# Patient Record
Sex: FEMALE | Race: WHITE | NOT HISPANIC OR LATINO | Employment: FULL TIME | URBAN - METROPOLITAN AREA
[De-identification: names, ages, dates, MRNs, and addresses within clinical notes are randomized per-mention and may not be internally consistent; named-entity substitution may affect disease eponyms.]

---

## 2022-07-06 ENCOUNTER — OFFICE VISIT (OUTPATIENT)
Dept: FAMILY MEDICINE CLINIC | Facility: CLINIC | Age: 32
End: 2022-07-06
Payer: COMMERCIAL

## 2022-07-06 VITALS
HEIGHT: 70 IN | RESPIRATION RATE: 16 BRPM | WEIGHT: 181.2 LBS | SYSTOLIC BLOOD PRESSURE: 122 MMHG | DIASTOLIC BLOOD PRESSURE: 80 MMHG | BODY MASS INDEX: 25.94 KG/M2 | HEART RATE: 85 BPM

## 2022-07-06 DIAGNOSIS — Z12.4 PAP SMEAR FOR CERVICAL CANCER SCREENING: ICD-10-CM

## 2022-07-06 DIAGNOSIS — J35.8 TONSIL ASYMMETRY: ICD-10-CM

## 2022-07-06 DIAGNOSIS — R00.2 PALPITATIONS: Primary | ICD-10-CM

## 2022-07-06 PROCEDURE — 3725F SCREEN DEPRESSION PERFORMED: CPT | Performed by: PHYSICIAN ASSISTANT

## 2022-07-06 PROCEDURE — 99214 OFFICE O/P EST MOD 30 MIN: CPT | Performed by: PHYSICIAN ASSISTANT

## 2022-07-06 RX ORDER — MELATONIN
5000 DAILY
COMMUNITY

## 2022-07-06 RX ORDER — RIBOFLAVIN (VITAMIN B2) 100 MG
100 TABLET ORAL DAILY
COMMUNITY

## 2022-07-06 RX ORDER — DIPHENOXYLATE HYDROCHLORIDE AND ATROPINE SULFATE 2.5; .025 MG/1; MG/1
1 TABLET ORAL DAILY
COMMUNITY

## 2022-07-06 NOTE — PROGRESS NOTES
Assessment/Plan:    1  Palpitations    - reviewed CBC, CMP, will order echo and TSh due to patient family history of mitral valve replacement and symptoms  Can consider holter also  - TSH, 3rd generation with Free T4 reflex; Future  - Echo complete w/ contrast if indicated; Future    2  Tonsil asymmetry    - advised normal per dentist, area examined today, possible stone, difficult to visualize as it seems to be resolving  Advised if area gets larger, painful will refer to [facility]    F/u as needed    Subjective:   Chief Complaint   Patient presents with   Levy Establish Care    Physical Exam    cardiac problem    Spot on tonsil       Patient ID: Mikayla Whittington is a 27 y o  female  Patient mom had mitral valve replacement age 43, her sister also had same valve replacement  Had a clot and was found second  Has had palpitations off and on  Was getting them daily every day for a week, felt like it was stumbling over it self  Then resolved  Brief  Has had Lyme infection in the past  Has had it twice, gets fever, chills, shakes  Gets treated with doxy  They also put her on prednisone for the joint swelling  The following portions of the patient's history were reviewed and updated as appropriate: allergies, current medications, past family history, past medical history, past social history, past surgical history and problem list     History reviewed  No pertinent past medical history    Past Surgical History:   Procedure Laterality Date    WISDOM TOOTH EXTRACTION       Family History   Problem Relation Age of Onset    Heart Valve Disease Mother     Anxiety disorder Father     Anxiety disorder Brother     Depression Brother     Bipolar disorder Brother     Alcohol abuse Neg Hx     Substance Abuse Neg Hx      Social History     Socioeconomic History    Marital status: /Civil Union     Spouse name: Not on file    Number of children: Not on file    Years of education: Not on file    Highest education level: Not on file   Occupational History    Not on file   Tobacco Use    Smoking status: Never Smoker    Smokeless tobacco: Never Used   Vaping Use    Vaping Use: Never used   Substance and Sexual Activity    Alcohol use: Not Currently    Drug use: Not Currently    Sexual activity: Not on file   Other Topics Concern    Not on file   Social History Narrative    Not on file     Social Determinants of Health     Financial Resource Strain: Not on file   Food Insecurity: Not on file   Transportation Needs: Not on file   Physical Activity: Not on file   Stress: Not on file   Social Connections: Not on file   Intimate Partner Violence: Not on file   Housing Stability: Not on file       Current Outpatient Medications:     Ascorbic Acid (vitamin C) 100 MG tablet, Take 100 mg by mouth daily, Disp: , Rfl:     cholecalciferol (VITAMIN D3) 1,000 units tablet, Take 5,000 Units by mouth daily, Disp: , Rfl:     multivitamin (THERAGRAN) TABS, Take 1 tablet by mouth daily, Disp: , Rfl:     Review of Systems          Objective:    Vitals:    07/06/22 1414   BP: 122/80   Pulse: 85   Resp: 16   Weight: 82 2 kg (181 lb 3 2 oz)   Height: 5' 9 5" (1 765 m)        Physical Exam  Constitutional:       Appearance: Normal appearance  She is well-developed and normal weight  HENT:      Head: Normocephalic and atraumatic  Mouth/Throat:      Mouth: Mucous membranes are moist       Pharynx: No oropharyngeal exudate or posterior oropharyngeal erythema  Comments: Right upper tonsil small faint white spot 1 mm  Cardiovascular:      Rate and Rhythm: Normal rate and regular rhythm  Pulses: Normal pulses  Heart sounds: Normal heart sounds  Pulmonary:      Effort: Pulmonary effort is normal       Breath sounds: Normal breath sounds  Musculoskeletal:         General: Normal range of motion  Cervical back: Normal range of motion and neck supple  Lymphadenopathy:      Cervical: No cervical adenopathy  Skin:     General: Skin is warm  Neurological:      General: No focal deficit present  Mental Status: She is alert and oriented to person, place, and time  Psychiatric:         Mood and Affect: Mood normal          Behavior: Behavior normal          Thought Content: Thought content normal          Judgment: Judgment normal              BMI Counseling: Body mass index is 26 37 kg/m²  The BMI is above normal  Nutrition recommendations include reducing portion sizes

## 2022-07-07 ENCOUNTER — APPOINTMENT (OUTPATIENT)
Dept: LAB | Facility: CLINIC | Age: 32
End: 2022-07-07
Payer: COMMERCIAL

## 2022-07-07 DIAGNOSIS — R00.2 PALPITATIONS: ICD-10-CM

## 2022-07-07 LAB — TSH SERPL DL<=0.05 MIU/L-ACNC: 1.73 UIU/ML (ref 0.45–4.5)

## 2022-07-07 PROCEDURE — 36415 COLL VENOUS BLD VENIPUNCTURE: CPT

## 2022-07-07 PROCEDURE — 84443 ASSAY THYROID STIM HORMONE: CPT

## 2022-07-08 ENCOUNTER — HOSPITAL ENCOUNTER (OUTPATIENT)
Dept: NON INVASIVE DIAGNOSTICS | Facility: HOSPITAL | Age: 32
Discharge: HOME | End: 2022-07-08
Payer: COMMERCIAL

## 2022-07-08 VITALS
BODY MASS INDEX: 25.91 KG/M2 | HEIGHT: 70 IN | SYSTOLIC BLOOD PRESSURE: 145 MMHG | HEART RATE: 73 BPM | DIASTOLIC BLOOD PRESSURE: 73 MMHG | WEIGHT: 181 LBS

## 2022-07-08 DIAGNOSIS — R00.2 PALPITATIONS: ICD-10-CM

## 2022-07-08 LAB
AORTIC ROOT: 2.7 CM
AORTIC VALVE MEAN VELOCITY: 10.4 M/S
APICAL FOUR CHAMBER EJECTION FRACTION: 68 %
ASCENDING AORTA: 2.7 CM
AV LVOT MEAN GRADIENT: 3 MMHG
AV LVOT PEAK GRADIENT: 8 MMHG
AV MEAN GRADIENT: 5 MMHG
AV PEAK GRADIENT: 9 MMHG
AV VELOCITY RATIO: 0.9
DOP CALC AO PEAK VEL: 1.53 M/S
DOP CALC AO VTI: 29.68 CM
DOP CALC LVOT PEAK VEL VTI: 26.07 CM
DOP CALC LVOT PEAK VEL: 1.37 M/S
E WAVE DECELERATION TIME: 196 MS
FRACTIONAL SHORTENING: 31 (ref 28–44)
INTERVENTRICULAR SEPTUM IN DIASTOLE (PARASTERNAL SHORT AXIS VIEW): 0.8 CM
INTERVENTRICULAR SEPTUM: 0.8 CM (ref 0.6–1.1)
LAAS-AP2: 16.8 CM2
LAAS-AP4: 12.9 CM2
LEFT ATRIUM SIZE: 3.1 CM
LEFT INTERNAL DIMENSION IN SYSTOLE: 3.1 CM (ref 2.1–4)
LEFT VENTRICULAR INTERNAL DIMENSION IN DIASTOLE: 4.5 CM (ref 3.5–6)
LEFT VENTRICULAR POSTERIOR WALL IN END DIASTOLE: 0.8 CM
LEFT VENTRICULAR STROKE VOLUME: 55 ML
LVSV (TEICH): 55 ML
MV E'TISSUE VEL-LAT: 22 CM/S
MV E'TISSUE VEL-SEP: 15 CM/S
MV PEAK A VEL: 0.5 M/S
MV PEAK E VEL: 102 CM/S
MV STENOSIS PRESSURE HALF TIME: 57 MS
MV VALVE AREA P 1/2 METHOD: 3.86
RIGHT ATRIAL 2D VOLUME: 20 ML
RIGHT ATRIUM AREA SYSTOLE A4C: 10 CM2
RIGHT VENTRICLE ID DIMENSION: 2.6 CM
SL CV LEFT ATRIUM LENGTH A2C: 5.3 CM
SL CV LV EF: 67
SL CV PED ECHO LEFT VENTRICLE DIASTOLIC VOLUME (MOD BIPLANE) 2D: 93 ML
SL CV PED ECHO LEFT VENTRICLE SYSTOLIC VOLUME (MOD BIPLANE) 2D: 38 ML
TR MAX PG: 11 MMHG
TR PEAK VELOCITY: 1.6 M/S
TRICUSPID VALVE PEAK REGURGITATION VELOCITY: 1.64 M/S

## 2022-07-08 PROCEDURE — 93306 TTE W/DOPPLER COMPLETE: CPT | Performed by: INTERNAL MEDICINE

## 2022-07-08 PROCEDURE — 93306 TTE W/DOPPLER COMPLETE: CPT

## 2023-04-11 DIAGNOSIS — Z00.00 ROUTINE ADULT HEALTH MAINTENANCE: Primary | ICD-10-CM

## 2023-04-24 ENCOUNTER — OFFICE VISIT (OUTPATIENT)
Dept: FAMILY MEDICINE CLINIC | Facility: CLINIC | Age: 33
End: 2023-04-24

## 2023-04-24 VITALS
BODY MASS INDEX: 25.34 KG/M2 | SYSTOLIC BLOOD PRESSURE: 122 MMHG | HEART RATE: 74 BPM | DIASTOLIC BLOOD PRESSURE: 84 MMHG | RESPIRATION RATE: 16 BRPM | OXYGEN SATURATION: 98 % | WEIGHT: 177 LBS | HEIGHT: 70 IN

## 2023-04-24 DIAGNOSIS — Z00.00 ANNUAL PHYSICAL EXAM: Primary | ICD-10-CM

## 2023-04-24 NOTE — PROGRESS NOTES
ADULT ANNUAL PHYSICAL  [facility]    NAME: Noris Dacosta  AGE: 34 y o  SEX: female  : 1990     DATE: 2023     Assessment and Plan:     Healthy 34year old female    Immunizations and preventive care screenings were discussed with patient today  Appropriate education was printed on patient's after visit summary  Counseling:  Alcohol/drug use: discussed moderation in alcohol intake, the recommendations for healthy alcohol use, and avoidance of illicit drug use  Dental Health: discussed importance of regular tooth brushing, flossing, and dental visits  Injury prevention: discussed safety/seat belts, safety helmets, smoke detectors, carbon dioxide detectors, and smoking near bedding or upholstery  · Exercise: the importance of regular exercise/physical activity was discussed  Recommend exercise 3-5 times per week for at least 30 minutes  Return in 1 year (on 2024)  Chief Complaint:     Chief Complaint   Patient presents with   • Physical Exam      History of Present Illness:     Adult Annual Physical   Patient here for a comprehensive physical exam  The patient reports no problems  Diet and Physical Activity  · Diet/Nutrition: well balanced diet  · Exercise: moderate cardiovascular exercise, vigorous cardiovascular exercise, strength training exercises and 3-4 times a week on average  Depression Screening  PHQ-2/9 Depression Screening    Little interest or pleasure in doing things: 0 - not at all  Feeling down, depressed, or hopeless: 0 - not at all  PHQ-2 Score: 0  PHQ-2 Interpretation: Negative depression screen       General Health  · Sleep: sleeps well and gets 7-8 hours of sleep on average  · Hearing: normal - bilateral   · Vision: no vision problems  · Dental: regular dental visits and brushes teeth twice daily         /GYN Health  · Last menstrual period: regular  · PAP - pending     Review of Systems:     Review of Systems   Constitutional: Negative  HENT: Negative  Eyes: Negative  Respiratory: Negative  Cardiovascular: Negative  Gastrointestinal: Negative  Endocrine: Negative  Genitourinary: Negative  Musculoskeletal: Negative  Skin: Negative  Allergic/Immunologic: Negative  Neurological: Negative  Hematological: Negative  Psychiatric/Behavioral: Negative  Past Medical History:     History reviewed  No pertinent past medical history     Past Surgical History:     Past Surgical History:   Procedure Laterality Date   • WISDOM TOOTH EXTRACTION        Social History:     Social History     Socioeconomic History   • Marital status: /Civil Union     Spouse name: None   • Number of children: None   • Years of education: None   • Highest education level: None   Occupational History   • None   Tobacco Use   • Smoking status: Never   • Smokeless tobacco: Never   Vaping Use   • Vaping Use: Never used   Substance and Sexual Activity   • Alcohol use: Not Currently   • Drug use: Not Currently   • Sexual activity: None   Other Topics Concern   • None   Social History Narrative   • None     Social Determinants of Health     Financial Resource Strain: Not on file   Food Insecurity: Not on file   Transportation Needs: Not on file   Physical Activity: Not on file   Stress: Not on file   Social Connections: Not on file   Intimate Partner Violence: Not on file   Housing Stability: Not on file      Family History:     Family History   Problem Relation Age of Onset   • Heart Valve Disease Mother    • Anxiety disorder Father    • Anxiety disorder Brother    • Depression Brother    • Bipolar disorder Brother    • Heart Valve Disease Maternal Aunt    • Alcohol abuse Neg Hx    • Substance Abuse Neg Hx       Current Medications:     Current Outpatient Medications   Medication Sig Dispense Refill   • Ascorbic Acid (vitamin C) 100 MG tablet Take 100 mg by mouth "daily     • cholecalciferol (VITAMIN D3) 1,000 units tablet Take 5,000 Units by mouth daily     • multivitamin (THERAGRAN) TABS Take 1 tablet by mouth daily       No current facility-administered medications for this visit  Allergies:     No Known Allergies   Physical Exam:     /84 (BP Location: Left arm, Patient Position: Sitting, Cuff Size: Standard)   Pulse 74   Resp 16   Ht 5' 9 5\" (1 765 m)   Wt 80 3 kg (177 lb)   SpO2 98%   BMI 25 76 kg/m²     Physical Exam  Constitutional:       Appearance: Normal appearance  She is well-developed and normal weight  HENT:      Head: Normocephalic and atraumatic  Right Ear: Hearing, tympanic membrane, ear canal and external ear normal       Left Ear: Hearing, tympanic membrane, ear canal and external ear normal       Nose: Nose normal       Mouth/Throat:      Mouth: Mucous membranes are moist       Pharynx: Oropharynx is clear  Uvula midline  Eyes:      Extraocular Movements: Extraocular movements intact  Conjunctiva/sclera: Conjunctivae normal       Pupils: Pupils are equal, round, and reactive to light  Neck:      Thyroid: No thyromegaly  Cardiovascular:      Rate and Rhythm: Normal rate and regular rhythm  Heart sounds: Normal heart sounds  No murmur heard  Pulmonary:      Effort: Pulmonary effort is normal       Breath sounds: Normal breath sounds  Abdominal:      General: Bowel sounds are normal  There is no distension  Palpations: Abdomen is soft  There is no mass  Tenderness: There is no abdominal tenderness  Musculoskeletal:         General: Normal range of motion  Cervical back: Normal range of motion and neck supple  Lymphadenopathy:      Cervical: No cervical adenopathy  Skin:     General: Skin is warm  Neurological:      General: No focal deficit present  Mental Status: She is alert and oriented to person, place, and time  Cranial Nerves: No cranial nerve deficit        Deep Tendon Reflexes: " Reflexes normal    Psychiatric:         Mood and Affect: Mood normal          Behavior: Behavior normal          Thought Content:  Thought content normal          Judgment: Judgment normal           Fernando Gan PA-C   [address]

## 2023-07-07 ENCOUNTER — OFFICE VISIT (OUTPATIENT)
Dept: FAMILY MEDICINE CLINIC | Facility: CLINIC | Age: 33
End: 2023-07-07
Payer: COMMERCIAL

## 2023-07-07 VITALS
HEART RATE: 83 BPM | RESPIRATION RATE: 16 BRPM | TEMPERATURE: 98.2 F | OXYGEN SATURATION: 98 % | BODY MASS INDEX: 24.65 KG/M2 | DIASTOLIC BLOOD PRESSURE: 80 MMHG | SYSTOLIC BLOOD PRESSURE: 126 MMHG | HEIGHT: 70 IN | WEIGHT: 172.2 LBS

## 2023-07-07 DIAGNOSIS — R22.1 NECK MASS: Primary | ICD-10-CM

## 2023-07-07 PROCEDURE — 99214 OFFICE O/P EST MOD 30 MIN: CPT | Performed by: PHYSICIAN ASSISTANT

## 2023-07-07 RX ORDER — SODIUM FLUORIDE 5 MG/G
PASTE, DENTIFRICE DENTAL
COMMUNITY
Start: 2023-06-13

## 2023-07-07 NOTE — PROGRESS NOTES
Assessment/Plan:    1. Posterior neck mass - 1mm in size, freely movable, will US to confirm benign lesion    F/u as needed    Subjective:   Chief Complaint   Patient presents with   • Mass     On neck, by the hair   Noticed it a month ago  Now feels another one      Patient ID: Zoila Henson is a 34 y.o. female. Patient here with a lump in neck for 1 month the same size, now there is a second one. In the morning feels more like a lump then as day goes on kind of flattens like a pancake, then can be more prominent during day. The following portions of the patient's history were reviewed and updated as appropriate: allergies, current medications, past family history, past medical history, past social history, past surgical history and problem list.    History reviewed. No pertinent past medical history.   Past Surgical History:   Procedure Laterality Date   • WISDOM TOOTH EXTRACTION       Family History   Problem Relation Age of Onset   • Heart Valve Disease Mother    • Anxiety disorder Father    • Anxiety disorder Brother    • Depression Brother    • Bipolar disorder Brother    • Heart Valve Disease Maternal Aunt    • Alcohol abuse Neg Hx    • Substance Abuse Neg Hx      Social History     Socioeconomic History   • Marital status: /Civil Union     Spouse name: Not on file   • Number of children: Not on file   • Years of education: Not on file   • Highest education level: Not on file   Occupational History   • Not on file   Tobacco Use   • Smoking status: Never   • Smokeless tobacco: Never   Vaping Use   • Vaping Use: Never used   Substance and Sexual Activity   • Alcohol use: Not Currently   • Drug use: Not Currently   • Sexual activity: Not on file   Other Topics Concern   • Not on file   Social History Narrative   • Not on file     Social Determinants of Health     Financial Resource Strain: Not on file   Food Insecurity: Not on file   Transportation Needs: Not on file   Physical Activity: Not on file   Stress: Not on file   Social Connections: Not on file   Intimate Partner Violence: Not on file   Housing Stability: Not on file       Current Outpatient Medications:   •  Ascorbic Acid (vitamin C) 100 MG tablet, Take 100 mg by mouth daily, Disp: , Rfl:   •  cholecalciferol (VITAMIN D3) 1,000 units tablet, Take 5,000 Units by mouth daily, Disp: , Rfl:   •  multivitamin (THERAGRAN) TABS, Take 1 tablet by mouth daily, Disp: , Rfl:   •  Sodium Fluoride 5000 PPM 1.1 % CREA, Use as directed, Disp: , Rfl:     Review of Systems   Constitutional: Negative. HENT: Negative. Respiratory: Negative. Cardiovascular: Negative. Musculoskeletal: Negative. Neurological: Negative. Hematological: Negative. Objective:    Vitals:    07/07/23 1436   BP: 126/80   Pulse: 83   Resp: 16   Temp: 98.2 °F (36.8 °C)   SpO2: 98%   Weight: 78.1 kg (172 lb 3.2 oz)   Height: 5' 9.5" (1.765 m)        Physical Exam  Constitutional:       Appearance: Normal appearance. HENT:      Head: Normocephalic and atraumatic. Neck:      Comments: Right side of posterior neck, slightly lateral to cervical spine 1 mm soft, freely movable mass  Musculoskeletal:         General: Normal range of motion. Cervical back: Normal range of motion and neck supple. Skin:     General: Skin is warm. Neurological:      General: No focal deficit present. Mental Status: She is alert and oriented to person, place, and time. Psychiatric:         Mood and Affect: Mood normal.         Behavior: Behavior normal.         Thought Content:  Thought content normal.         Judgment: Judgment normal.

## 2023-07-10 ENCOUNTER — HOSPITAL ENCOUNTER (OUTPATIENT)
Dept: ULTRASOUND IMAGING | Facility: HOSPITAL | Age: 33
Discharge: HOME | End: 2023-07-10
Payer: COMMERCIAL

## 2023-07-10 DIAGNOSIS — R22.1 NECK MASS: ICD-10-CM

## 2023-07-10 PROCEDURE — 76536 US EXAM OF HEAD AND NECK: CPT

## 2023-09-18 ENCOUNTER — OFFICE VISIT (OUTPATIENT)
Age: 33
End: 2023-09-18
Payer: COMMERCIAL

## 2023-09-18 VITALS
DIASTOLIC BLOOD PRESSURE: 74 MMHG | BODY MASS INDEX: 24.08 KG/M2 | WEIGHT: 168.2 LBS | HEIGHT: 70 IN | SYSTOLIC BLOOD PRESSURE: 122 MMHG

## 2023-09-18 DIAGNOSIS — Z01.419 ENCOUNTER FOR ANNUAL ROUTINE GYNECOLOGICAL EXAMINATION: Primary | ICD-10-CM

## 2023-09-18 PROCEDURE — S0610 ANNUAL GYNECOLOGICAL EXAMINA: HCPCS | Performed by: OBSTETRICS & GYNECOLOGY

## 2023-09-18 NOTE — PROGRESS NOTES
Assessment/Plan:    Encounter for annual routine gynecological examination        David Zeng is a 34 y.o. female who presents for annual exam. Periods are regular. She denies any breast, urinary or sexual concerns. Planning to try for conception this December. Current contraception: condoms  History of abnormal Pap smear: no  Regular self breast exam: yes  History of abnormal mammogram: no  History of abnormal lipids: no    Menstrual History:  OB History        1    Para   1    Term   1       0    AB   0    Living   1       SAB   0    IAB   0    Ectopic   0    Multiple   0    Live Births   1                Patient's last menstrual period was 2023 (exact date). Period Cycle (Days):  (24-26)  Period Duration (Days): 3-7  Period Pattern: Regular  Menstrual Flow: Light, Moderate  Menstrual Control: Tampon  Menstrual Control Change Freq (Hours): 2-3  Dysmenorrhea: None    History reviewed. No pertinent past medical history. Family History   Problem Relation Age of Onset   • Heart Valve Disease Mother    • Anxiety disorder Father    • Anxiety disorder Brother    • Depression Brother    • Bipolar disorder Brother    • Heart Valve Disease Maternal Aunt    • Alcohol abuse Neg Hx    • Substance Abuse Neg Hx        The following portions of the patient's history were reviewed and updated as appropriate: allergies, current medications, past family history, past medical history, past social history, past surgical history and problem list.    Review of Systems  Pertinent items are noted in HPI.       Objective      /74 (BP Location: Right arm, Patient Position: Sitting, Cuff Size: Standard)   Ht 5' 9.5" (1.765 m)   Wt 76.3 kg (168 lb 3.2 oz)   LMP 2023 (Exact Date)   BMI 24.48 kg/m²     General:   alert and oriented, in no acute distress, appears stated age and cooperative   Heart:  Breasts: regular rate and rhythm   appear normal, no suspicious masses, no skin or nipple changes or axillary nodes.    Lungs: effort normal   Abdomen: soft, non-tender, without masses or organomegaly   Vulva: normal   Vagina: normal mucosa   Cervix: no lesions   Uterus: normal size, mobile, non-tender   Adnexa: normal adnexa and no mass, fullness, tenderness

## 2023-11-14 DIAGNOSIS — M79.673 PAIN OF FOOT, UNSPECIFIED LATERALITY: Primary | ICD-10-CM

## 2023-11-29 ENCOUNTER — APPOINTMENT (OUTPATIENT)
Dept: RADIOLOGY | Facility: CLINIC | Age: 33
End: 2023-11-29
Payer: COMMERCIAL

## 2023-11-29 ENCOUNTER — OFFICE VISIT (OUTPATIENT)
Dept: PODIATRY | Facility: CLINIC | Age: 33
End: 2023-11-29
Payer: COMMERCIAL

## 2023-11-29 VITALS
WEIGHT: 168 LBS | SYSTOLIC BLOOD PRESSURE: 144 MMHG | HEIGHT: 70 IN | BODY MASS INDEX: 24.05 KG/M2 | HEART RATE: 68 BPM | DIASTOLIC BLOOD PRESSURE: 77 MMHG

## 2023-11-29 DIAGNOSIS — M72.2 PLANTAR FASCIITIS: Primary | ICD-10-CM

## 2023-11-29 DIAGNOSIS — M77.32 CALCANEAL SPUR, LEFT: ICD-10-CM

## 2023-11-29 DIAGNOSIS — M79.673 PAIN OF FOOT, UNSPECIFIED LATERALITY: ICD-10-CM

## 2023-11-29 DIAGNOSIS — M79.672 LEFT FOOT PAIN: ICD-10-CM

## 2023-11-29 PROCEDURE — 73630 X-RAY EXAM OF FOOT: CPT

## 2023-11-29 PROCEDURE — 99203 OFFICE O/P NEW LOW 30 MIN: CPT | Performed by: PODIATRIST

## 2023-11-29 NOTE — LETTER
November 29, 2023     Tammyalize IsaacDarian, [address] [facility],[address], [address] [address]    Patient: Silvestre Bañuelos   YOB: 1990   Date of Visit: 11/29/2023       Dear Dr. Christopher Sibley: Thank you for referring Silvestre Bañuelos to me for evaluation. Below are my notes for this consultation. If you have questions, please do not hesitate to call me. I look forward to following your patient along with you. Sincerely,        Dennys Sandoval DPM        CC: No Recipients    ERIC Cash [facility]  11/29/2023  3:22 PM  Sign when Signing Visit                 PATIENT:  Silvestre Bañuelos  1990         ASSESSMENT:     1. Plantar fasciitis  Ambulatory referral to Physical Therapy      2. Calcaneal spur, left        3. Left foot pain  Ambulatory Referral to Podiatry    XR foot 3+ vw left    Ambulatory referral to Physical Therapy                PLAN:  1. Reviewed medical records. Reviewed the note from PCP. Patient was counseled and educated on the condition and the diagnosis. 2. X-ray was obtained and personally reviewed. The radiological findings were discussed with the patient. 3. The exam and symptoms are consistent with plantar fasciitis. The diagnosis, treatment options and prognosis were discussed with the patient. 4. Pt not amenable for injection or medicine. Referred her to PT. 5. Instructed supportive care, home exercise, icing, and proper footwear/ arch support. Discussed possible injection depending on the progress. 6. Patient will return in 6 weeks for re-evaluation. Imaging: I have personally reviewed pertinent films in PACS  Labs, pathology, and Other Studies: I have personally reviewed pertinent reports. Subjective:       HPI  The patient was referred to my office for evaluation of left heel pain. She had it for about 8 months now. Pain is significant around left heel. Pain level is about 6-7 out of 10. Pain increases with walking and standing. The patient does not recall any injury. No associated numbness or paresthesia. No significant weakness or dysfunction. The following portions of the patient's history were reviewed and updated as appropriate: allergies, current medications, past family history, past medical history, past social history, past surgical history and problem list.  All pertinent labs and images were reviewed. Past Medical History  History reviewed. No pertinent past medical history. Past Surgical History  Past Surgical History:   Procedure Laterality Date   • WISDOM TOOTH EXTRACTION          Allergies:  Patient has no known allergies. Medications:  Current Outpatient Medications   Medication Sig Dispense Refill   • Ascorbic Acid (vitamin C) 100 MG tablet Take 100 mg by mouth daily     • cholecalciferol (VITAMIN D3) 1,000 units tablet Take 5,000 Units by mouth daily     • multivitamin (THERAGRAN) TABS Take 1 tablet by mouth daily     • Sodium Fluoride 5000 PPM 1.1 % CREA Use as directed       No current facility-administered medications for this visit.        Social History:  Social History     Socioeconomic History   • Marital status: /Civil Union     Spouse name: None   • Number of children: None   • Years of education: None   • Highest education level: None   Occupational History   • None   Tobacco Use   • Smoking status: Never   • Smokeless tobacco: Never   Vaping Use   • Vaping Use: Never used   Substance and Sexual Activity   • Alcohol use: Not Currently   • Drug use: Not Currently   • Sexual activity: Yes     Partners: Male     Birth control/protection: Condom   Other Topics Concern   • None   Social History Narrative   • None     Social Determinants of Health     Financial Resource Strain: Not on file   Food Insecurity: Not on file   Transportation Needs: Not on file   Physical Activity: Not on file   Stress: Not on file   Social Connections: Not on file   Intimate Partner Violence: Not on file   Housing Stability: Not on file          Review of Systems   Constitutional:  Negative for chills and fever. Respiratory:  Negative for cough and shortness of breath. Cardiovascular:  Negative for chest pain. Gastrointestinal:  Negative for nausea and vomiting. Musculoskeletal:  Negative for gait problem. Skin:  Negative for wound. Allergic/Immunologic: Negative for immunocompromised state. Neurological:  Negative for weakness and numbness. Hematological: Negative. Psychiatric/Behavioral:  Negative for behavioral problems and confusion. Objective:      /77   Pulse 68   Ht 5' 9.5" (1.765 m)   Wt 76.2 kg (168 lb)   BMI 24.45 kg/m²          Physical Exam  Vitals reviewed. Constitutional:       General: She is not in acute distress. Appearance: She is not toxic-appearing or diaphoretic. HENT:      Head: Normocephalic and atraumatic. Eyes:      Extraocular Movements: Extraocular movements intact. Cardiovascular:      Rate and Rhythm: Normal rate and regular rhythm. Pulses: Normal pulses. Dorsalis pedis pulses are 2+ on the right side and 2+ on the left side. Posterior tibial pulses are 2+ on the right side and 2+ on the left side. Pulmonary:      Effort: Pulmonary effort is normal. No respiratory distress. Musculoskeletal:         General: Tenderness present. No swelling or signs of injury. Cervical back: Normal range of motion and neck supple. Right lower leg: No edema. Left lower leg: No edema. Right foot: No foot drop. Left foot: No foot drop. Comments: Focal pain in left plantar medial heel. No pain on lateral compression left heel. No Tinel sign. Skin:     General: Skin is warm. Capillary Refill: Capillary refill takes less than 2 seconds. Coloration: Skin is not cyanotic or mottled. Findings: No abscess. Nails: There is no clubbing. Neurological:      General: No focal deficit present. Mental Status: She is alert and oriented to person, place, and time. Cranial Nerves: No cranial nerve deficit. Sensory: No sensory deficit. Motor: No weakness. Coordination: Coordination normal.   Psychiatric:         Mood and Affect: Mood normal.         Behavior: Behavior normal.         Thought Content:  Thought content normal.         Judgment: Judgment normal.

## 2023-11-29 NOTE — PROGRESS NOTES
PATIENT:  Vijaya Sanchez  1990         ASSESSMENT:     1. Plantar fasciitis  Ambulatory referral to Physical Therapy      2. Calcaneal spur, left        3. Left foot pain  Ambulatory Referral to Podiatry    XR foot 3+ vw left    Ambulatory referral to Physical Therapy                PLAN:  1. Reviewed medical records. Reviewed the note from PCP. Patient was counseled and educated on the condition and the diagnosis. 2. X-ray was obtained and personally reviewed. The radiological findings were discussed with the patient. 3. The exam and symptoms are consistent with plantar fasciitis. The diagnosis, treatment options and prognosis were discussed with the patient. 4. Pt not amenable for injection or medicine. Referred her to PT. 5. Instructed supportive care, home exercise, icing, and proper footwear/ arch support. Discussed possible injection depending on the progress. 6. Patient will return in 6 weeks for re-evaluation. Imaging: I have personally reviewed pertinent films in PACS  Labs, pathology, and Other Studies: I have personally reviewed pertinent reports. Subjective:       HPI  The patient was referred to my office for evaluation of left heel pain. She had it for about 8 months now. Pain is significant around left heel. Pain level is about 6-7 out of 10. Pain increases with walking and standing. The patient does not recall any injury. No associated numbness or paresthesia. No significant weakness or dysfunction. The following portions of the patient's history were reviewed and updated as appropriate: allergies, current medications, past family history, past medical history, past social history, past surgical history and problem list.  All pertinent labs and images were reviewed. Past Medical History  History reviewed. No pertinent past medical history.     Past Surgical History  Past Surgical History:   [facility] Later[facility] Date    [facility]          Allergies:  Patient has no known allergies. Medications:  Current Outpatient Medications   Medication Sig Dispense Refill    Ascorbic Acid (vitamin C) 100 MG tablet Take 100 mg by mouth daily      cholecalciferol (VITAMIN D3) 1,000 units tablet Take 5,000 Units by mouth daily      multivitamin (THERAGRAN) TABS Take 1 tablet by mouth daily      Sodium Fluoride 5000 PPM 1.1 % CREA Use as directed       No current facility-administered medications for this visit. Social History:  Social History     Socioeconomic History    Marital status: /Civil Union     Spouse name: None    Number of children: None    Years of education: None    Highest education level: None   Occupational History    None   Tobacco Use    Smoking status: Never    Smokeless tobacco: Never   Vaping Use    Vaping Use: Never used   Substance and Sexual Activity    Alcohol use: Not Currently    Drug use: Not Currently    Sexual activity: Yes     Partners: Male     Birth control/protection: Condom   Other Topics Concern    None   Social History Narrative    None     Social Determinants of Health     Financial Resource Strain: Not on file   Food Insecurity: Not on file   Transportation Needs: Not on file   Physical Activity: Not on file   Stress: Not on file   Social Connections: Not on file   Intimate Partner Violence: Not on file   Housing Stability: Not on file          Review of Systems   Constitutional:  Negative for chills and fever. Respiratory:  Negative for cough and shortness of breath. Cardiovascular:  Negative for chest pain. Gastrointestinal:  Negative for nausea and vomiting. Musculoskeletal:  Negative for gait problem. Skin:  Negative for wound. Allergic/Immunologic: Negative for immunocompromised state. Neurological:  Negative for weakness and numbness. Hematological: Negative. Psychiatric/Behavioral:  Negative for behavioral problems and confusion.           Objective:      BP 144/77   Pulse 68   Ht 5' 9.5" (1.765 m)   Wt 76.2 kg (168 lb)   BMI 24.45 kg/m²          Physical Exam  Vitals reviewed. Constitutional:       General: She is not in acute distress. Appearance: She is not toxic-appearing or diaphoretic. HENT:      Head: Normocephalic and atraumatic. Eyes:      Extraocular Movements: Extraocular movements intact. Cardiovascular:      Rate and Rhythm: Normal rate and regular rhythm. Pulses: Normal pulses. Dorsalis pedis pulses are 2+ on the right side and 2+ on the left side. Posterior tibial pulses are 2+ on the right side and 2+ on the left side. Pulmonary:      Effort: Pulmonary effort is normal. No respiratory distress. Musculoskeletal:         General: Tenderness present. No swelling or signs of injury. Cervical back: Normal range of motion and neck supple. Right lower leg: No edema. Left lower leg: No edema. Right foot: No foot drop. Left foot: No foot drop. Comments: Focal pain in left plantar medial heel. No pain on lateral compression left heel. No Tinel sign. Skin:     General: Skin is warm. Capillary Refill: Capillary refill takes less than 2 seconds. Coloration: Skin is not cyanotic or mottled. Findings: No abscess. Nails: There is no clubbing. Neurological:      General: No focal deficit present. Mental Status: She is alert and oriented to person, place, and time. Cranial Nerves: No cranial nerve deficit. Sensory: No sensory deficit. Motor: No weakness. Coordination: Coordination normal.   Psychiatric:         Mood and Affect: Mood normal.         Behavior: Behavior normal.         Thought Content:  Thought content normal.         Judgment: Judgment normal.

## 2023-12-05 NOTE — PROGRESS NOTES
PT Evaluation     Today's date: 2023  Patient name: Jose Antonio Larios  : 1990  MRN: [id number]  Referring provider: Amber Diaz DPM  Dx:   Encounter Diagnosis     ICD-10-CM    1. Left foot pain  M79.672 Ambulatory referral to Physical Therapy      2. Plantar fasciitis  M72.2 Ambulatory referral to Physical Therapy                     Assessment  Assessment details: Pt is a 28 y.o. female presenting to PT with chief complaint of chronic L heel/foot pain. PT findings include: limited DF ROM, great toe ext ROM deficits, positive windlass test, and recreation of pain to palpation of medial calcaneal tubercle. Findings are consistent with diagnosis. Pt educated on PT findings, anatomy, biomechanics, POC and rehab course. Pt will benefit from skilled PT to address above impairments to return to PLOF with less restriction and to reach functional goals. Impairments: abnormal gait, abnormal or restricted ROM, impaired physical strength and pain with function    Symptom irritability: low  Goals  1. Pt will demonstrate understanding of HEP and POC in order to improve compliance with course of rehab in 2 weeks. 2. Pt will maintain wearing appropriate shoes in order to decrease irritation to L heel for 4 weeks. 3. Pt's DF ROM will improve to WNL to decrease excessive plantar fascia loads with gait by d/c.   4.  Pt's pain will be 2/10 or less to allow pt to return to PLOF with least restriction by d/c.        Plan  Patient would benefit from: skilled physical therapy  Planned modality interventions: low level laser therapy  Planned therapy interventions: joint mobilization, manual therapy, patient education, strengthening, stretching, therapeutic activities, therapeutic exercise, home exercise program, flexibility and functional ROM exercises  Frequency: 2x week  Duration in weeks: 6  Treatment plan discussed with: patient    Subjective Evaluation    History of Present Illness  Mechanism of injury: Pt arrives to PT via referral from podiatrist for chronic L heel pain. Pt was diagnosed with plantar fasciitis. This has been going on for about 8 months. She denies any specific mechanism of injury. She experiences pain upon standing after being non weightbearing for period of time, as day progresses pain increases as well. Pt reports that she has been doing some stretches. She also reports massaging the foot. She has trialed foam rolling the calf and rolling the bottom of foot with lacrosse ball as well. No injection or medication trialed. Quality of life: good    Patient Goals  Patient goals for therapy: decreased pain, increased motion, increased strength and independence with ADLs/IADLs    Pain  Current pain ratin  At worst pain ratin  Location: L foot  Quality: sharp, tight and discomfort        Objective    Lunge WB DF ROM:  R 7cm; L 8cm    Great toe Ext:  90°/90°    Ankle ROM:  DF: 15°/10°  PF: WNL B    Ankle strength:  DF: 5/5; 5/5  PF: 5/5; 5/5  EV: 5/5; 5/5  INV: 5/5; 5/5    Ankle Mobility:  TCJ AP: hypo B  Calcaneal med tilt: normal B  Calcaneal Lat tilt/glide: hypo B (>L)    Palpation: tenderness to palpation of medial calcaneal tubercle with pain recreation, no tenderness to mid substance plantar fascia    Windlass: positive. Precautions: None      Manuals             EPAT DI 2.0 barr 15Hz             IASTM NV            Laser NV            Fib AP leukotape Consider NV.              Calcaneal lateral glide/tilt mob             TCJ AP mob Consider WB mob            Neuro Re-Ed                                                                                                        Ther Ex             Calf stretch             Soleus stretch             Heel raise             HS stretch                                                                 Ther Activity                                       Gait Training                                       Modalities

## 2023-12-06 ENCOUNTER — EVALUATION (OUTPATIENT)
Dept: PHYSICAL THERAPY | Facility: CLINIC | Age: 33
End: 2023-12-06
Payer: COMMERCIAL

## 2023-12-06 DIAGNOSIS — M79.672 LEFT FOOT PAIN: ICD-10-CM

## 2023-12-06 DIAGNOSIS — M72.2 PLANTAR FASCIITIS: ICD-10-CM

## 2023-12-06 PROCEDURE — 97161 PT EVAL LOW COMPLEX 20 MIN: CPT | Performed by: PHYSICAL THERAPIST

## 2023-12-06 PROCEDURE — 97140 MANUAL THERAPY 1/> REGIONS: CPT | Performed by: PHYSICAL THERAPIST

## 2023-12-08 ENCOUNTER — OFFICE VISIT (OUTPATIENT)
Dept: PHYSICAL THERAPY | Facility: CLINIC | Age: 33
End: 2023-12-08
Payer: COMMERCIAL

## 2023-12-08 DIAGNOSIS — M79.672 LEFT FOOT PAIN: Primary | ICD-10-CM

## 2023-12-08 PROCEDURE — 97110 THERAPEUTIC EXERCISES: CPT | Performed by: PHYSICAL THERAPIST

## 2023-12-08 PROCEDURE — 97140 MANUAL THERAPY 1/> REGIONS: CPT | Performed by: PHYSICAL THERAPIST

## 2023-12-08 PROCEDURE — 97530 THERAPEUTIC ACTIVITIES: CPT | Performed by: PHYSICAL THERAPIST

## 2023-12-08 NOTE — PROGRESS NOTES
Daily Note     Today's date: 2023  Patient name: Pretty Johnson  : 1990  MRN: [id number]  Referring provider: Wisam Carvajal DPM  Dx:   Encounter Diagnosis     ICD-10-CM    1. Left foot pain  M79.672                      Subjective:pt reports no sig changes since her IE. Objective: See treatment diary below      Assessment: initiated tx as listed with no complaints. She has good tolerance to IASTM. Monitor response and progress as john NV. Plan: Continue per plan of care. Precautions: None      Manuals            EPAT DI 2.0 barr 15Hz             IASTM NV Kl PF and calf           Laser NV 4'           Fib AP leukotape Consider NV.              Calcaneal lateral glide/tilt mob  kl           TCJ AP mob Consider WB mob Kl supine           Neuro Re-Ed                                                                                                        Ther Ex             Calf stretch  Pro stretch 30"x4           Soleus stretch             Heel raise             HS stretch             Wobble board  30ea                                                    Ther Activity                                         Gait Training                                       Modalities

## 2023-12-12 ENCOUNTER — OFFICE VISIT (OUTPATIENT)
Dept: PHYSICAL THERAPY | Facility: CLINIC | Age: 33
End: 2023-12-12
Payer: COMMERCIAL

## 2023-12-12 DIAGNOSIS — M79.672 LEFT FOOT PAIN: Primary | ICD-10-CM

## 2023-12-12 DIAGNOSIS — M72.2 PLANTAR FASCIITIS: ICD-10-CM

## 2023-12-12 PROCEDURE — 97140 MANUAL THERAPY 1/> REGIONS: CPT | Performed by: PHYSICAL THERAPIST

## 2023-12-12 PROCEDURE — 97110 THERAPEUTIC EXERCISES: CPT | Performed by: PHYSICAL THERAPIST

## 2023-12-12 NOTE — PROGRESS NOTES
Daily Note     Today's date: 2023  Patient name: Nita Michaels  : 1990  MRN: [id number]  Referring provider: Blank Fairchild DPM  Dx:   Encounter Diagnosis     ICD-10-CM    1. Left foot pain  M79.672       2. Plantar fasciitis  M72.2                      Subjective: Pt reports temporary relief with IASTM last visit. She states that she performed calf stretches with high intensity and felt increased soreness at home. Objective: See treatment diary below      Assessment: Performed EPAT today and performed mobilizations as listed. Introduced loading of plantar fascia with heel raises; provided education on progressive loading. Taped patient with AP fibular leukotape technique, will assess pt's response. Self mobilizations reviewed. Pt will benefit from continued skilled PT. Plan: Continue per plan of care. Precautions: None      Manuals           EPAT DI 2.0 barr 15Hz   DL 2.4 barr 15 Hz          IASTM NV Kl PF and calf           Laser NV 4'           Fib AP leukotape Consider NV.    DL          Calcaneal lateral glide/tilt mob  kl DL          TCJ AP mob Consider WB mob Kl supine DL + MWM lungharini B          Neuro Re-Ed                                                                                                        Ther Ex             Calf stretch  Pro stretch 30"x4           Soleus stretch             Heel raise   3x10          HS stretch   manual          Wobble board  30ea             Self ankle mobilization   HEP                                    Ther Activity                                         Gait Training                                       Modalities

## 2023-12-15 ENCOUNTER — OFFICE VISIT (OUTPATIENT)
Dept: PHYSICAL THERAPY | Facility: CLINIC | Age: 33
End: 2023-12-15
Payer: COMMERCIAL

## 2023-12-15 DIAGNOSIS — M79.672 LEFT FOOT PAIN: Primary | ICD-10-CM

## 2023-12-15 DIAGNOSIS — M72.2 PLANTAR FASCIITIS: ICD-10-CM

## 2023-12-15 PROCEDURE — 97140 MANUAL THERAPY 1/> REGIONS: CPT

## 2023-12-15 PROCEDURE — 97110 THERAPEUTIC EXERCISES: CPT

## 2023-12-15 NOTE — PROGRESS NOTES
Daily Note     Today's date: 12/15/2023  Patient name: Lb Martinez  : 1990  MRN: [id number]  Referring provider: Dominick Manrique DPM  Dx:   Encounter Diagnosis     ICD-10-CM    1. Left foot pain  M79.672       2. Plantar fasciitis  M72.2           Start Time:   Stop Time: 151  Total time in clinic (min): 40 minutes      Subjective: Patient complains of continued left heel pain. Objective: See treatment diary below. Assessment: Treatment is tolerated well. Left heel is tender to palpation. Plan: Continue treatment as per PT plan of care. Precautions: None      Manuals 12/6 12/8 12/12 12/15         EPAT DI 2.0 barr 15Hz   DL 2.4 barr 15 Hz          IASTM NV Kl PF and calf  and DTM  JLW         Laser NV 4'  5'  16W  JLW         L ankle PROM    JLW         Fib AP leukotape Consider NV.    DL          Calcaneal lateral glide/tilt mob  kl DL          TCJ AP mob Consider WB mob Kl supine DL + MWM lungharini ABBOTT                       Neuro Re-Ed                                                                                           Ther Ex             Calf stretch  Pro stretch 30"x4  pro stretch  30"x4         Soleus stretch             Heel raise   3x10 3x10         HS stretch   manual          Wobble board  30ea  a/p, m/l  30 ea           Self ankle mobilization   HEP          tandem walking on foam    6 laps                      Ther Activity                                         Gait Training                                       Modalities

## 2023-12-18 ENCOUNTER — OFFICE VISIT (OUTPATIENT)
Dept: PHYSICAL THERAPY | Facility: CLINIC | Age: 33
End: 2023-12-18
Payer: COMMERCIAL

## 2023-12-18 DIAGNOSIS — M79.672 LEFT FOOT PAIN: Primary | ICD-10-CM

## 2023-12-18 DIAGNOSIS — M72.2 PLANTAR FASCIITIS: ICD-10-CM

## 2023-12-18 PROCEDURE — 97140 MANUAL THERAPY 1/> REGIONS: CPT

## 2023-12-18 PROCEDURE — 97110 THERAPEUTIC EXERCISES: CPT

## 2023-12-18 NOTE — PROGRESS NOTES
"Daily Note     Today's date: 2023  Patient name: Angelica Ratliff  : 1990  MRN: [id number]  Referring provider: Maximino Douglas DPM  Dx:   Encounter Diagnosis     ICD-10-CM    1. Left foot pain  M79.672       2. Plantar fasciitis  M72.2                      Subjective: Pt reports her L heel was quite painful after LV. Notes her pain level is 3/10 upon arrival to therapy, adding it is never lower then 3/10.    Objective: See treatment diary below    Assessment: Performed exercise program w/o complaint. Able to john EPAT, as below. Responded well to mobilizations. Tolerated treatment well. Will monitor. MD, PT  recommended pt get a shoe with an arch fill.    Plan: Continue per plan of care.      Precautions: None      Manuals 12/6 12/8 12/12 12/15 12/18        EPAT DI 2.0 barr 15Hz   DL 2.4 barr 15 Hz  DI  3.5 barr  15Hz        IASTM NV Kl PF and calf  and DTM  JLW NV        Laser NV 4'  5'  16W  JLW NV        L ankle PROM    JLW MO        Fib AP leukotape Consider NV.   DL          Calcaneal lateral glide/tilt mob  kl DL  MD        TCJ AP mob Consider WB mob Kl supine DL + MWM lizette ABBOTT MD                     Neuro Re-Ed                                                                                           Ther Ex             Calf stretch  Pro stretch 30\"x4  pro stretch  30\"x4 Pro stretch  30\"x4        Soleus stretch             Heel raise   3x10 3x10 3x10        HS stretch   manual          Wobble board  30ea  a/p, m/l  30 ea A/p, m/I 30 ea          Self ankle mobilization   HEP          tandem walking on foam    6 laps 6 laps                     Ther Activity                                         Gait Training                                       Modalities                                                "

## 2023-12-19 ENCOUNTER — APPOINTMENT (OUTPATIENT)
Dept: PHYSICAL THERAPY | Facility: CLINIC | Age: 33
End: 2023-12-19
Payer: COMMERCIAL

## 2023-12-20 ENCOUNTER — OFFICE VISIT (OUTPATIENT)
Dept: PHYSICAL THERAPY | Facility: CLINIC | Age: 33
End: 2023-12-20
Payer: COMMERCIAL

## 2023-12-20 DIAGNOSIS — M79.672 LEFT FOOT PAIN: Primary | ICD-10-CM

## 2023-12-20 DIAGNOSIS — M72.2 PLANTAR FASCIITIS: ICD-10-CM

## 2023-12-20 PROCEDURE — 97110 THERAPEUTIC EXERCISES: CPT | Performed by: PHYSICAL THERAPIST

## 2023-12-20 PROCEDURE — 97140 MANUAL THERAPY 1/> REGIONS: CPT | Performed by: PHYSICAL THERAPIST

## 2023-12-20 NOTE — PROGRESS NOTES
"Daily Note     Today's date: 2023  Patient name: Angelica Ratliff  : 1990  MRN: [id number]  Referring provider: Maximino Douglas DPM  Dx:   Encounter Diagnosis     ICD-10-CM    1. Left foot pain  M79.672       2. Plantar fasciitis  M72.2                      Subjective: Pt reports pain is relatively unchanged. Today feels like the heel is more irritated. She is planning on getting shoes with more arch support.       Objective: See treatment diary below      Assessment: Continued with treatment today, pt does present with pes cavus. Trialed leukotape to support arch. Will monitor pt's response. Pt will benefit from continued skilled PT.       Plan: Continue per plan of care.      Precautions: None      Manuals 12/6 12/8 12/12 12/15 12/18 12/20       EPAT DI 2.0 barr 15Hz   DL 2.4 barr 15 Hz  DI  3.5 barr  15Hz        IASTM NV Kl PF and calf  and DTM  JLW NV DL       Laser NV 4'  5'  16W  JLW NV        L ankle PROM    JLW MO DL       Fib AP leukotape Consider NV.   DL   Navicular sling       Calcaneal lateral glide/tilt mob  kl DL  MD DL       TCJ AP mob Consider WB mob Kl supine DL + MWM lizette NICHOLSON                     Neuro Re-Ed                                                                                           Ther Ex             Calf stretch  Pro stretch 30\"x4  pro stretch  30\"x4 Pro stretch  30\"x4        Soleus stretch             Heel raise   3x10 3x10 3x10 3x15       HS stretch   manual          Wobble board  30ea  a/p, m/l  30 ea A/p, m/I 30 ea   A/P, M/L 30 ea       Self ankle mobilization   HEP          tandem walking on foam    6 laps 6 laps        SLS      30\"x3       Ther Activity                                         Gait Training                                       Modalities                                                  "

## 2023-12-26 ENCOUNTER — OFFICE VISIT (OUTPATIENT)
Dept: PHYSICAL THERAPY | Facility: CLINIC | Age: 33
End: 2023-12-26
Payer: COMMERCIAL

## 2023-12-26 DIAGNOSIS — M79.672 LEFT FOOT PAIN: Primary | ICD-10-CM

## 2023-12-26 DIAGNOSIS — M72.2 PLANTAR FASCIITIS: ICD-10-CM

## 2023-12-26 PROCEDURE — 97110 THERAPEUTIC EXERCISES: CPT | Performed by: PHYSICAL THERAPIST

## 2023-12-26 PROCEDURE — 97140 MANUAL THERAPY 1/> REGIONS: CPT | Performed by: PHYSICAL THERAPIST

## 2023-12-26 NOTE — PROGRESS NOTES
"Daily Note     Today's date: 2023  Patient name: Angelica Ratliff  : 1990  MRN: [id number]  Referring provider: Maximino Douglas DPM  Dx:   Encounter Diagnosis     ICD-10-CM    1. Left foot pain  M79.672       2. Plantar fasciitis  M72.2                      Subjective: Pt reports that she did notice an improvement with taping. She purchased shoes with better arch support. By the end of the day, she continues with pain however.       Objective: See treatment diary below      Assessment: Pt tolerates treatment well. The fact that she improved with navicular sling, likely will improve with arch support. Pt will benefit from continued skilled PT.       Plan: Continue per plan of care.      Precautions: None      Manuals 12/6 12/8 12/12 12/15 12/18 12/20 12/26      EPAT DI 2.0 barr 15Hz   DL 2.4 barr 15 Hz  DI  3.5 barr  15Hz  DI  3.5 barr  15Hz      IASTM NV Kl PF and calf  and DTM  JLW NV DL       Laser NV 4'  5'  16W  JLW NV        L ankle PROM    JLW MO DL       Fib AP leukotape Consider NV.   DL   Navicular sling DL navicular sling      Calcaneal lateral glide/tilt mob  kl DL  MD DL DL      TCJ AP mob Consider WB mob Kl supine DL + MWM lunge B  MD DL  DL                   Neuro Re-Ed                                                                                           Ther Ex             Calf stretch  Pro stretch 30\"x4  pro stretch  30\"x4 Pro stretch  30\"x4        Soleus stretch             Heel raise   3x10 3x10 3x10 3x15 3x15      HS stretch   manual          Wobble board  30ea  a/p, m/l  30 ea A/p, m/I 30 ea   A/P, M/L 30 ea A/P, M/L 30 ea      Self ankle mobilization   HEP          tandem walking on foam    6 laps 6 laps        SLS      30\"x3       Ther Activity                                         Gait Training                                       Modalities                                                    "

## 2023-12-28 ENCOUNTER — OFFICE VISIT (OUTPATIENT)
Dept: PHYSICAL THERAPY | Facility: CLINIC | Age: 33
End: 2023-12-28
Payer: COMMERCIAL

## 2023-12-28 DIAGNOSIS — M72.2 PLANTAR FASCIITIS: ICD-10-CM

## 2023-12-28 DIAGNOSIS — M79.672 LEFT FOOT PAIN: Primary | ICD-10-CM

## 2023-12-28 PROCEDURE — 97140 MANUAL THERAPY 1/> REGIONS: CPT

## 2023-12-28 PROCEDURE — 97110 THERAPEUTIC EXERCISES: CPT

## 2023-12-28 NOTE — PROGRESS NOTES
"Daily Note     Today's date: 2023  Patient name: Angelica Ratliff  : 1990  MRN: [id number]  Referring provider: Maximino Douglas DPM  Dx:   Encounter Diagnosis     ICD-10-CM    1. Left foot pain  M79.672       2. Plantar fasciitis  M72.2                      Subjective: Pt reports no change in L heel pain.    Objective: See treatment diary below    Assessment: Performed exercise program w/o issue. Restrictions noted L arch and heel during IASTM. Applied tape as below. Will monitor.    Plan: Continue per plan of care.      Precautions: None      Manuals 12/6 12/8 12/12 12/15 12/18 12/20 12/26 12/28     EPAT DI 2.0 barr 15Hz   DL 2.4 barr 15 Hz  DI  3.5 barr  15Hz  DI  3.5 barr  15Hz      IASTM NV Kl PF and calf  and DTM  JLW NV DL  MO     Laser NV 4'  5'  16W  JLW NV        L ankle PROM    JLW MO DL       Fib AP leukotape Consider NV.   DL   Navicular sling DL navicular sling FH navicular sling     Calcaneal lateral glide/tilt mob  kl DL  MD DL DL FH     TCJ AP mob Consider WB mob Kl supine DL + MWM lunge B  MD DL  DL FH                  Neuro Re-Ed                                                                                           Ther Ex             Calf stretch  Pro stretch 30\"x4  pro stretch  30\"x4 Pro stretch  30\"x4        Soleus stretch             Heel raise   3x10 3x10 3x10 3x15 3x15 3x15     HS stretch   manual          Wobble board  30ea  a/p, m/l  30 ea A/p, m/I 30 ea   A/P, M/L 30 ea A/P, M/L 30 ea A/P,  M/L  30 ea     Self ankle mobilization   HEP          tandem walking on foam    6 laps 6 laps        SLS      30\"x3       Ther Activity                                         Gait Training                                       Modalities                                                      "

## 2024-01-02 ENCOUNTER — OFFICE VISIT (OUTPATIENT)
Dept: PHYSICAL THERAPY | Facility: CLINIC | Age: 34
End: 2024-01-02
Payer: COMMERCIAL

## 2024-01-02 DIAGNOSIS — M72.2 PLANTAR FASCIITIS: ICD-10-CM

## 2024-01-02 DIAGNOSIS — M79.672 LEFT FOOT PAIN: Primary | ICD-10-CM

## 2024-01-02 PROCEDURE — 97110 THERAPEUTIC EXERCISES: CPT | Performed by: PHYSICAL THERAPIST

## 2024-01-02 PROCEDURE — 97140 MANUAL THERAPY 1/> REGIONS: CPT | Performed by: PHYSICAL THERAPIST

## 2024-01-02 NOTE — PROGRESS NOTES
"Daily Note     Today's date: 2024  Patient name: Angelica Ratliff  : 1990  MRN: [id number]  Referring provider: Maximino Douglas DPM  Dx:   Encounter Diagnosis     ICD-10-CM    1. Left foot pain  M79.672       2. Plantar fasciitis  M72.2                      Subjective: Pt reports that she feels like things are going in the right direction with shoe wear change. She also reports that she has gotten oofos shoes for wearing inside her home.       Objective: See treatment diary below      Assessment: Pt tolerates treatment well today without significant complaints. Introduced heel raises with also u/l heel raise to load plantar fascia and intrinsics. Concluded with EPAT and navicular sling taping to support arch. Trialed low back exercises to determine if there is lumbar component to pain. Pt will benefit from continued skilled PT.       Plan: Continue per plan of care.      Precautions: None      Manuals 12/6 12/8 12/12 12/15 12/18 12/20 12/26 12/28 1/2    EPAT DI 2.0 barr 15Hz   DL 2.4 barr 15 Hz  DI  3.5 barr  15Hz  DI  3.5 barr  15Hz  DI  3.5 barr  15Hz    IASTM NV Kl PF and calf  and DTM  JLW NV DL  MO     Laser NV 4'  5'  16W  JLW NV        L ankle PROM    JLW MO DL       Fib AP leukotape Consider NV.   DL   Navicular sling DL navicular sling FH navicular sling DL navicular sling    Calcaneal lateral glide/tilt mob  kl DL  MD DL DL FH DL    TCJ AP mob Consider WB mob Kl supine DL + MWM lunge B  MD DL  DL FH DL    Lumbar PA         DL Gr III-IV    Neuro Re-Ed                                                                                           Ther Ex             Calf stretch  Pro stretch 30\"x4  pro stretch  30\"x4 Pro stretch  30\"x4        Soleus stretch             Heel raise   3x10 3x10 3x10 3x15 3x15 3x15 B 3x10 airex    U/l heel raise         U/l 3x10 ea. Without UE support    Sciatic nerve glide         Seated 2x10    Supine SLR         2x10     S/l hip abduction         2x10     Prone hip extension   " "      2x10                  HS stretch   manual          Wobble board  30ea  a/p, m/l  30 ea A/p, m/I 30 ea   A/P, M/L 30 ea A/P, M/L 30 ea A/P,  M/L  30 ea     Self ankle mobilization   HEP          tandem walking on foam    6 laps 6 laps        SLS      30\"x3       Ther Activity                                         Gait Training                                       Modalities                                                        "

## 2024-01-04 ENCOUNTER — OFFICE VISIT (OUTPATIENT)
Dept: PHYSICAL THERAPY | Facility: CLINIC | Age: 34
End: 2024-01-04
Payer: COMMERCIAL

## 2024-01-04 DIAGNOSIS — M79.672 LEFT FOOT PAIN: Primary | ICD-10-CM

## 2024-01-04 DIAGNOSIS — M72.2 PLANTAR FASCIITIS: ICD-10-CM

## 2024-01-04 PROCEDURE — 97140 MANUAL THERAPY 1/> REGIONS: CPT | Performed by: PHYSICAL THERAPIST

## 2024-01-04 NOTE — PROGRESS NOTES
"Daily Note     Today's date: 2024  Patient name: Angelica Ratliff  : 1990  MRN: [id number]  Referring provider: Maximino Douglas DPM  Dx:   Encounter Diagnosis     ICD-10-CM    1. Left foot pain  M79.672       2. Plantar fasciitis  M72.2                      Subjective: Pt reports the following day after PT, felt increased pain. She arrives to PT noting irritation of the heel.       Objective: See treatment diary below      Assessment: Despite EPAT and IASTM with progressive loading on plantar fascia, pt continues with relatively unchanged pain. Pt has had temporary relief with taping which was continued today. Pt to trial OTC orthotics and will re-assess pt's response. Pt will benefit from continued skilled PT.       Plan: Continue per plan of care.      Precautions: None      Manuals 12/6 12/8 12/12 12/15 12/18 12/20 12/26 12/28 1/2 1/4   EPAT DI 2.0 barr 15Hz   DL 2.4 barr 15 Hz  DI  3.5 barr  15Hz  DI  3.5 barr  15Hz  DI  3.5 barr  15Hz    IASTM NV Kl PF and calf  and DTM  JLW NV DL  MO  DL   STM plantar fascia          Mid substance foot intrinsics DL   Laser NV 4'  5'  16W  JLW NV        L ankle PROM    JLW MO DL    DL   Fib AP leukotape Consider NV.   DL   Navicular sling DL navicular sling FH navicular sling DL navicular sling DL fib AP   Calcaneal lateral glide/tilt mob  kl DL  MD DL DL FH DL DL   TCJ AP mob Consider WB mob Kl supine DL + MWM lunge B  MD DL  DL FH DL DL   Lumbar PA         DL Gr III-IV    Neuro Re-Ed                                                                                           Ther Ex             Calf stretch  Pro stretch 30\"x4  pro stretch  30\"x4 Pro stretch  30\"x4        Soleus stretch             Heel raise   3x10 3x10 3x10 3x15 3x15 3x15 B 3x10 airex    U/l heel raise         U/l 3x10 ea. Without UE support    Sciatic nerve glide         Seated 2x10    Supine SLR         2x10     S/l hip abduction         2x10     Prone hip extension         2x10                  HS " "stretch   manual          Wobble board  30ea  a/p, m/l  30 ea A/p, m/I 30 ea   A/P, M/L 30 ea A/P, M/L 30 ea A/P,  M/L  30 ea     Self ankle mobilization   HEP          tandem walking on foam    6 laps 6 laps        SLS      30\"x3       Ther Activity                                         Gait Training                                       Modalities                                                          "

## 2024-01-09 ENCOUNTER — OFFICE VISIT (OUTPATIENT)
Dept: PHYSICAL THERAPY | Facility: CLINIC | Age: 34
End: 2024-01-09
Payer: COMMERCIAL

## 2024-01-09 DIAGNOSIS — M79.672 LEFT FOOT PAIN: Primary | ICD-10-CM

## 2024-01-09 DIAGNOSIS — M72.2 PLANTAR FASCIITIS: ICD-10-CM

## 2024-01-09 PROCEDURE — 97140 MANUAL THERAPY 1/> REGIONS: CPT | Performed by: PHYSICAL THERAPIST

## 2024-01-09 NOTE — PROGRESS NOTES
"Discharge    Today's date: 2024  Patient name: Angelica Ratliff  : 1990  MRN: [id number]  Referring provider: Maximino Douglas DPM  Dx:   Encounter Diagnosis     ICD-10-CM    1. Left foot pain  M79.672       2. Plantar fasciitis  M72.2                      Subjective: Pt reports no significant improvements or changes in symptoms since last PT visit.       Objective: See treatment diary below      Assessment: PT plan of care thus far involved EPAT, plantar flexor loading, and tricep surae flexibility; As pt has trialed physical therapy consistently with no significant changes pt advised to refer back to physician for potential further next steps. Pt has not trialed orthotics which is likely next appropriate step. Did advise pt to continue with heel raise progressions prior to return to heavier recreational activities such as running. Pt to be discharged for this episode of care.       Plan: Continue per plan of care.      Precautions: None      Manuals 1/9 12/8 12/12 12/15 12/18 12/20 12/26 12/28 1/2 1/4   EPAT   DL 2.4 barr 15 Hz  DI  3.5 barr  15Hz  DI  3.5 barr  15Hz  DI  3.5 barr  15Hz    IASTM DL Kl PF and calf  and DTM  JLW NV DL  MO  DL   STM plantar fascia DL         Mid substance foot intrinsics DL   Laser  4'  5'  16W  JLW NV        L ankle PROM    JLW MO DL    DL   Fib AP leukotape Navicular sling DL  DL   Navicular sling DL navicular sling FH navicular sling DL navicular sling DL fib AP   Calcaneal lateral glide/tilt mob DL kl DL  MD DL DL FH DL DL   TCJ AP mob DL Kl supine DL + MWM lunge B  MD DL  DL FH DL DL   Lumbar PA         DL Gr III-IV    Neuro Re-Ed                                                                                           Ther Ex             Calf stretch  Pro stretch 30\"x4  pro stretch  30\"x4 Pro stretch  30\"x4        Soleus stretch             Heel raise   3x10 3x10 3x10 3x15 3x15 3x15 B 3x10 airex    U/l heel raise         U/l 3x10 ea. Without UE support    Sciatic nerve " "glide         Seated 2x10    Supine SLR         2x10     S/l hip abduction         2x10     Prone hip extension         2x10                  HS stretch   manual          Wobble board  30ea  a/p, m/l  30 ea A/p, m/I 30 ea   A/P, M/L 30 ea A/P, M/L 30 ea A/P,  M/L  30 ea     Self ankle mobilization   HEP          tandem walking on foam    6 laps 6 laps        Tests/measures DL            SLS      30\"x3       Ther Activity                                         Gait Training                                       Modalities                                                            "

## 2024-01-12 ENCOUNTER — APPOINTMENT (OUTPATIENT)
Dept: PHYSICAL THERAPY | Facility: CLINIC | Age: 34
End: 2024-01-12
Payer: COMMERCIAL

## 2024-02-23 ENCOUNTER — OFFICE VISIT (OUTPATIENT)
Age: 34
End: 2024-02-23
Payer: COMMERCIAL

## 2024-02-23 VITALS
WEIGHT: 182 LBS | HEIGHT: 69 IN | SYSTOLIC BLOOD PRESSURE: 110 MMHG | DIASTOLIC BLOOD PRESSURE: 62 MMHG | BODY MASS INDEX: 26.96 KG/M2

## 2024-02-23 DIAGNOSIS — L91.8 FIBROEPITHELIAL POLYP: ICD-10-CM

## 2024-02-23 DIAGNOSIS — N88.8 NABOTHIAN CYST: Primary | ICD-10-CM

## 2024-02-23 PROCEDURE — 99213 OFFICE O/P EST LOW 20 MIN: CPT | Performed by: OBSTETRICS & GYNECOLOGY

## 2024-02-23 NOTE — PROGRESS NOTES
GYN Problem Visit    HPI: Patient noted two separate lesions on cervix during self exam.  No pain.  No vaginal discharge.  No PCB.    PMH, PSH, Meds, Allergies, SocHx, FamHx reviewed and no changes noted.    Review of Systems   Constitutional: Negative for chills, decreased appetite, fever and malaise/fatigue.   Gastrointestinal:  Negative for bloating, abdominal pain, nausea and vomiting.   Genitourinary:  Negative for menorrhagia, missed menses, non-menstrual bleeding and pelvic pain.   Neurological:  Negative for dizziness, headaches, light-headedness and weakness.     Vitals:    02/23/24 0708   BP: 110/62       Physical Exam  Constitutional:       Appearance: Normal appearance.   Genitourinary:      Vulva normal.      No lesions in the vagina.      Genitourinary Comments: Fibroepithelial polyp at 8 o'clock  Nabothian cyst at 4 o'clock      No vaginal discharge, erythema, bleeding or ulceration.      No vaginal prolapse present.     No vaginal atrophy present.       Right Adnexa: not tender, not full and no mass present.     Left Adnexa: not tender, not full and no mass present.     Cervical polyp and nabothian cyst present.         HENT:      Head: Normocephalic.   Cardiovascular:      Rate and Rhythm: Normal rate and regular rhythm.   Pulmonary:      Effort: Pulmonary effort is normal.   Abdominal:      General: There is no distension.      Palpations: Abdomen is soft.   Musculoskeletal:         General: No swelling.   Neurological:      General: No focal deficit present.      Mental Status: She is alert and oriented to person, place, and time.   Skin:     General: Skin is warm and dry.   Psychiatric:         Mood and Affect: Mood normal.         Behavior: Behavior normal.   Vitals reviewed.      Impression/Plan:    1. Nabothian cyst  2. Fibroepithelial polyp    Reassured wnl   RTO for annual or sooner PRN

## 2024-04-30 ENCOUNTER — OFFICE VISIT (OUTPATIENT)
Dept: FAMILY MEDICINE CLINIC | Facility: CLINIC | Age: 34
End: 2024-04-30
Payer: COMMERCIAL

## 2024-04-30 VITALS
WEIGHT: 183 LBS | HEIGHT: 69 IN | DIASTOLIC BLOOD PRESSURE: 80 MMHG | OXYGEN SATURATION: 98 % | SYSTOLIC BLOOD PRESSURE: 122 MMHG | TEMPERATURE: 98 F | RESPIRATION RATE: 16 BRPM | HEART RATE: 69 BPM | BODY MASS INDEX: 27.11 KG/M2

## 2024-04-30 DIAGNOSIS — K64.4 EXTERNAL HEMORRHOIDS: ICD-10-CM

## 2024-04-30 DIAGNOSIS — Z00.00 ANNUAL PHYSICAL EXAM: Primary | ICD-10-CM

## 2024-04-30 PROCEDURE — 99395 PREV VISIT EST AGE 18-39: CPT | Performed by: PHYSICIAN ASSISTANT

## 2024-04-30 PROCEDURE — 3725F SCREEN DEPRESSION PERFORMED: CPT | Performed by: PHYSICIAN ASSISTANT

## 2024-04-30 RX ORDER — HYDROCORTISONE 25 MG/G
CREAM TOPICAL 2 TIMES DAILY
Qty: 28 G | Refills: 1 | Status: SHIPPED | OUTPATIENT
Start: 2024-04-30

## 2024-04-30 NOTE — PROGRESS NOTES
ADULT ANNUAL PHYSICAL  [facility]    NAME: Angelica Ratliff  AGE: 33 y.o. SEX: female  : 1990     DATE: 2024     Assessment and Plan:     33 year old female    Immunizations and preventive care screenings were discussed with patient today. Appropriate education was printed on patient's after visit summary.    Counseling:  Alcohol/drug use: discussed moderation in alcohol intake, the recommendations for healthy alcohol use, and avoidance of illicit drug use.  Dental Health: discussed importance of regular tooth brushing, flossing, and dental visits.  Injury prevention: discussed safety/seat belts, safety helmets, smoke detectors, carbon dioxide detectors, and smoking near bedding or upholstery.  Sexual health: discussed sexually transmitted diseases, partner selection, use of condoms, avoidance of unintended pregnancy, and contraceptive alternatives.  Exercise: the importance of regular exercise/physical activity was discussed. Recommend exercise 3-5 times per week for at least 30 minutes.        Follow up 1 year or sooner if needed     Chief Complaint:     Chief Complaint   Patient presents with    Physical Exam      History of Present Illness:     Adult Annual Physical   Patient here for a comprehensive physical exam. The patient reports no problems.    Diet and Physical Activity  Diet/Nutrition: well balanced diet.   Exercise: vigorous cardiovascular exercise and strength training exercises.      Depression Screening  PHQ-2/9 Depression Screening    Little interest or pleasure in doing things: 0 - not at all  Feeling down, depressed, or hopeless: 0 - not at all  PHQ-2 Score: 0  PHQ-2 Interpretation: Negative depression screen       General Health  Sleep: sleeps well.   Hearing: normal - bilateral.  Vision: goes for regular eye exams.   Dental: regular dental visits and brushes teeth twice daily.       /GYN Health  Follows  with gynecology? yes   Last menstrual period:         Review of Systems:     Review of Systems   Constitutional: Negative.    HENT: Negative.     Eyes: Negative.    Respiratory: Negative.     Cardiovascular: Negative.    Gastrointestinal: Negative.    Endocrine: Negative.    Genitourinary: Negative.    Musculoskeletal: Negative.    Skin: Negative.    Allergic/Immunologic: Negative.    Neurological: Negative.    Hematological: Negative.    Psychiatric/Behavioral: Negative.        Past Medical History:     History reviewed. No pertinent past medical history.   Past Surgical History:     Past Surgical History:   Procedure Laterality Date    WISDOM TOOTH EXTRACTION        Social History:     Social History     Socioeconomic History    Marital status: /Civil Union     Spouse name: None    Number of children: None    Years of education: None    Highest education level: None   Occupational History    None   Tobacco Use    Smoking status: Never    Smokeless tobacco: Never   Vaping Use    Vaping status: Never Used   Substance and Sexual Activity    Alcohol use: Not Currently    Drug use: Never    Sexual activity: Yes     Partners: Male     Birth control/protection: Condom   Other Topics Concern    None   Social History Narrative    None     Social Determinants of Health     Financial Resource Strain: Not on file   Food Insecurity: Not on file   Transportation Needs: Not on file   Physical Activity: Not on file   Stress: Not on file   Social Connections: Not on file   Intimate Partner Violence: Not on file   Housing Stability: Not on file      Family History:     Family History   Problem Relation Age of Onset    Heart Valve Disease Mother     Anxiety disorder Father     Anxiety disorder Brother     Depression Brother     Bipolar disorder Brother     Heart Valve Disease Maternal Aunt     Alcohol abuse Neg Hx     Substance Abuse Neg Hx       Current Medications:     Current Outpatient Medications   Medication Sig  "Dispense Refill    Ascorbic Acid (vitamin C) 100 MG tablet Take 100 mg by mouth daily      cholecalciferol (VITAMIN D3) 1,000 units tablet Take 5,000 Units by mouth daily      multivitamin (THERAGRAN) TABS Take 1 tablet by mouth daily      Sodium Fluoride 5000 PPM 1.1 % CREA Use as directed      Ferrous Sulfate (IRON PO) Take 1 tablet by mouth daily (Patient not taking: Reported on 4/30/2024)       No current facility-administered medications for this visit.      Allergies:     No Known Allergies   Physical Exam:     /80   Pulse 69   Temp 98 °F (36.7 °C) (Temporal)   Resp 16   Ht 5' 9.25\" (1.759 m)   Wt 83 kg (183 lb)   SpO2 98%   BMI 26.83 kg/m²     Physical Exam  Constitutional:       Appearance: Normal appearance. She is well-developed and normal weight.   HENT:      Head: Normocephalic and atraumatic.      Right Ear: Hearing, tympanic membrane, ear canal and external ear normal.      Left Ear: Hearing, tympanic membrane, ear canal and external ear normal.      Nose: Nose normal.      Mouth/Throat:      Mouth: Mucous membranes are moist.      Pharynx: Oropharynx is clear. Uvula midline.   Eyes:      Extraocular Movements: Extraocular movements intact.      Conjunctiva/sclera: Conjunctivae normal.      Pupils: Pupils are equal, round, and reactive to light.   Neck:      Thyroid: No thyromegaly.   Cardiovascular:      Rate and Rhythm: Normal rate and regular rhythm.      Heart sounds: Normal heart sounds. No murmur heard.  Pulmonary:      Effort: Pulmonary effort is normal.      Breath sounds: Normal breath sounds.   Abdominal:      General: Bowel sounds are normal. There is no distension.      Palpations: Abdomen is soft. There is no mass.      Tenderness: There is no abdominal tenderness.   Musculoskeletal:         General: Normal range of motion.      Cervical back: Normal range of motion and neck supple.   Lymphadenopathy:      Cervical: No cervical adenopathy.   Skin:     General: Skin is warm. "   Neurological:      General: No focal deficit present.      Mental Status: She is alert and oriented to person, place, and time.      Cranial Nerves: No cranial nerve deficit.      Deep Tendon Reflexes: Reflexes normal.   Psychiatric:         Mood and Affect: Mood normal.         Behavior: Behavior normal.         Thought Content: Thought content normal.         Judgment: Judgment normal.          Nikole Vizcarra PA-C   [facility]

## 2024-06-07 DIAGNOSIS — O22.40 HEMORRHOIDS DURING PREGNANCY, ANTEPARTUM: Primary | ICD-10-CM

## 2024-06-07 RX ORDER — LIDOCAINE 50 MG/G
OINTMENT TOPICAL AS NEEDED
Qty: 30 G | Refills: 1 | Status: SHIPPED | OUTPATIENT
Start: 2024-06-07

## 2024-06-17 ENCOUNTER — TELEPHONE (OUTPATIENT)
Age: 34
End: 2024-06-17

## 2024-06-17 DIAGNOSIS — O22.40 HEMORRHOIDS DURING PREGNANCY, ANTEPARTUM: Primary | ICD-10-CM

## 2024-06-18 NOTE — PROGRESS NOTES
Ambulatory Visit  Name: Angelica Ratliff      : 1990      MRN: [id number]  Encounter Provider: Yesica Castro MD  Encounter Date: 2024   [facility] [place]    Assessment & Plan   1. Anal fissure  2. Hemorrhoids during pregnancy, antepartum  -     Ambulatory Referral to Colorectal Surgery  -     NIFEdipine 0.3%-lidocaine 5% rectal ointment; Apply 1 Application topically every 4 (four) hours as needed for discomfort or pain Apply a small amount to anal fissure  No evidence of thrombosis of small external hemorrhoidal skin tags on exam  Significant anterior midline tenderness consistent with fissure  Counseled patient on importance of fiber supplementation and adequate hydration  Prescribed topical nifedipine to be used three times per day for two months  Cautioned patient against overcleaning and excessive use of ointments on the perianal skin, which could worsen symptoms of itching  If patient would like to pursue hemorrhoidectomy, recommend waiting until 6 months postpartum  Follow up in office 2-3 months if symptoms are persistent    History of Present Illness     Angelica Ratliff is a 33 y.o. female currently pregnant, with 8 weeks of gestation, who presents today with concerns for hemorrhoids.     The patient reported hemorrhoids for which she has tried Lidocaine ointment without improvement. Noted worsening throbbing pain, a constant burning/tearing feeling and bright red rectal bleeding with bowel movements. The patient also admitted to problems with constipation.      Review of Systems   Constitutional:  Negative for chills and fever.   HENT:  Negative for ear pain and sore throat.    Eyes:  Negative for pain and visual disturbance.   Respiratory:  Negative for cough and shortness of breath.    Cardiovascular:  Negative for chest pain and palpitations.   Gastrointestinal:  Positive for rectal pain. Negative for abdominal pain and vomiting.  "  Genitourinary:  Negative for dysuria and hematuria.   Musculoskeletal:  Negative for arthralgias and back pain.   Skin:  Negative for color change and rash.   Neurological:  Negative for seizures and syncope.   All other systems reviewed and are negative.    Past Medical History   History reviewed. No pertinent past medical history.  Past Surgical History:   Procedure Laterality Date    WISDOM TOOTH EXTRACTION       Family History   Problem Relation Age of Onset    Heart Valve Disease Mother     Anxiety disorder Father     Anxiety disorder Brother     Depression Brother     Bipolar disorder Brother     Heart Valve Disease Maternal Aunt     Alcohol abuse Neg Hx     Substance Abuse Neg Hx      Current Outpatient Medications on File Prior to Visit   Medication Sig Dispense Refill    Ascorbic Acid (vitamin C) 100 MG tablet Take 100 mg by mouth daily      cholecalciferol (VITAMIN D3) 1,000 units tablet Take 5,000 Units by mouth daily      docusate sodium (COLACE) 100 mg capsule Take 100 mg by mouth 2 (two) times a day      lidocaine (XYLOCAINE) 5 % ointment Apply topically as needed for mild pain 30 g 1    multivitamin (THERAGRAN) TABS Take 1 tablet by mouth daily      omega-3-acid ethyl esters (LOVAZA) 1 g capsule Take 2 g by mouth 2 (two) times a day      polyethylene glycol (GLYCOLAX) 17 GM/SCOOP powder Take 17 g by mouth daily      [DISCONTINUED] hydrocortisone (ANUSOL-HC) 2.5 % rectal cream Apply topically 2 (two) times a day 28 g 1    Ferrous Sulfate (IRON PO) Take 1 tablet by mouth daily (Patient not taking: Reported on 4/30/2024)      Sodium Fluoride 5000 PPM 1.1 % CREA Use as directed (Patient not taking: Reported on 6/19/2024)       No current facility-administered medications on file prior to visit.   No Known Allergies   Objective     /78   Ht 5' 9\" (1.753 m)   Wt 83.9 kg (185 lb)   BMI 27.32 kg/m²     Physical Exam  Vitals and nursing note reviewed.   Constitutional:       General: She is not in " acute distress.     Appearance: She is well-developed.   HENT:      Head: Normocephalic and atraumatic.   Eyes:      Conjunctiva/sclera: Conjunctivae normal.   Cardiovascular:      Rate and Rhythm: Normal rate and regular rhythm.      Heart sounds: No murmur heard.  Pulmonary:      Effort: Pulmonary effort is normal. No respiratory distress.      Breath sounds: Normal breath sounds.   Abdominal:      Palpations: Abdomen is soft.      Tenderness: There is no abdominal tenderness.   Genitourinary:     Comments: 4x small non-thrombosed external hemorrhoidal skin tags without thrombosis  Hypertonic tone on digital anorectal exam with significant anterior midline tenderness consistent with fissure  Anoscopy unable to be tolerated  Musculoskeletal:         General: No swelling.      Cervical back: Neck supple.   Skin:     General: Skin is warm and dry.      Capillary Refill: Capillary refill takes less than 2 seconds.   Neurological:      Mental Status: She is alert.   Psychiatric:         Mood and Affect: Mood normal.       Administrative Statements   I have spent a total time of 32 minutes on 06/19/24 In caring for this patient including Diagnostic results, Prognosis, Risks and benefits of tx options, Instructions for management, Patient and family education, Importance of tx compliance, Risk factor reductions, Impressions, Counseling / Coordination of care, Documenting in the medical record, Reviewing / ordering tests, medicine, procedures  , Obtaining or reviewing history  , and Communicating with other healthcare professionals .

## 2024-06-19 ENCOUNTER — OFFICE VISIT (OUTPATIENT)
Age: 34
End: 2024-06-19
Payer: COMMERCIAL

## 2024-06-19 VITALS
DIASTOLIC BLOOD PRESSURE: 78 MMHG | SYSTOLIC BLOOD PRESSURE: 126 MMHG | WEIGHT: 185 LBS | HEIGHT: 69 IN | BODY MASS INDEX: 27.4 KG/M2

## 2024-06-19 DIAGNOSIS — O22.40 HEMORRHOIDS DURING PREGNANCY, ANTEPARTUM: ICD-10-CM

## 2024-06-19 DIAGNOSIS — K60.2 ANAL FISSURE: Primary | ICD-10-CM

## 2024-06-19 PROCEDURE — 99203 OFFICE O/P NEW LOW 30 MIN: CPT | Performed by: SURGERY

## 2024-06-19 RX ORDER — DOCUSATE SODIUM 100 MG/1
100 CAPSULE, LIQUID FILLED ORAL 2 TIMES DAILY
COMMUNITY

## 2024-06-19 RX ORDER — POLYETHYLENE GLYCOL 3350 17 G/17G
17 POWDER, FOR SOLUTION ORAL DAILY
COMMUNITY

## 2024-06-19 RX ORDER — OMEGA-3-ACID ETHYL ESTERS 1 G/1
2 CAPSULE, LIQUID FILLED ORAL 2 TIMES DAILY
COMMUNITY

## 2024-06-19 NOTE — PATIENT INSTRUCTIONS
Use topical nifedipine (compounded Rx) three times a day for two months    You may use CALMOSEPTINE up to four times per day    High fiber diet/increased hydration, 20-30 grams fiber per day, increased fruits/vegetables    Start fiber supplementation with benefiber. You may put this in your coffee/tea/juice in the morning. Start with 2 tsp once per day. If you experience bloating or gassiness, you may start with 1 tsp once per day. You may increase fiber supplementation to UP TO 2 tsp three times per day in order to achieve 1 formed bowel movement once per day.    Aim to drink at least 64 oz of water per day      High Fiber Diet   AMBULATORY CARE:   A high-fiber diet  includes foods that have a high amount of fiber. Fiber is the part of fruits, vegetables, and grains that is not broken down by your body. Fiber keeps your bowel movements regular. Fiber can also help lower your cholesterol level, control blood sugar in people with diabetes, and relieve constipation. Fiber can also help you control your weight because it helps you feel full faster. Most adults should eat 25 to 35 grams of fiber each day. Talk to your dietitian or healthcare provider about the amount of fiber you need.  Good sources of fiber:       Foods with at least 4 grams of fiber per serving:      ? to ½ cup of high-fiber cereal (check the nutrition label on the box)    ½ cup of blackberries or raspberries    4 dried prunes    1 cooked artichoke    ½ cup of cooked legumes, such as lentils, or red, kidney, and muir beans    Foods with 1 to 3 grams of fiber per servin slice of whole-wheat, pumpernickel, or rye bread    ½ cup of cooked brown rice    4 whole-wheat crackers    1 cup of oatmeal    ½ cup of cereal with 1 to 3 grams of fiber per serving (check the nutrition label on the box)    1 small piece of fruit, such as an apple, banana, pear, kiwi, or orange    3 dates    ½ cup of canned apricots, fruit cocktail, peaches, or pears    ½ cup  of raw or cooked vegetables, such as carrots, cauliflower, cabbage, spinach, squash, or corn  Ways that you can increase fiber in your diet:   Choose brown or wild rice instead of white rice.     Use whole wheat flour in recipes instead of white or all-purpose flour.     Add beans and peas to casseroles or soups.     Choose fresh fruit and vegetables with peels or skins on instead of juices.    Other diet guidelines to follow:   Add fiber to your diet slowly.  You may have abdominal discomfort, bloating, and gas if you add fiber to your diet too quickly.     Drink plenty of liquids as you add fiber to your diet.  You may have nausea or develop constipation if you do not drink enough water. Ask how much liquid to drink each day and which liquids are best for you.    © Copyright [employer] 2023 Information is for End User's use only and may not be sold, redistributed or otherwise used for commercial purposes.  The above information is an  only. It is not intended as medical advice for individual conditions or treatments. Talk to your doctor, nurse or pharmacist before following any medical regimen to see if it is safe and effective for you.

## 2024-07-05 ENCOUNTER — ULTRASOUND (OUTPATIENT)
Age: 34
End: 2024-07-05
Payer: COMMERCIAL

## 2024-07-05 VITALS — WEIGHT: 188.2 LBS | DIASTOLIC BLOOD PRESSURE: 64 MMHG | SYSTOLIC BLOOD PRESSURE: 122 MMHG | BODY MASS INDEX: 27.79 KG/M2

## 2024-07-05 DIAGNOSIS — N91.2 AMENORRHEA: Primary | ICD-10-CM

## 2024-07-05 PROCEDURE — 76817 TRANSVAGINAL US OBSTETRIC: CPT | Performed by: OBSTETRICS & GYNECOLOGY

## 2024-07-05 PROCEDURE — 99213 OFFICE O/P EST LOW 20 MIN: CPT | Performed by: OBSTETRICS & GYNECOLOGY

## 2024-07-05 NOTE — PROGRESS NOTES
Ambulatory Visit  Name: Angelica Ratliff      : 1990      MRN: [id number]  Encounter Provider: Marley Sheriff MD  Encounter Date: 2024   Encounter department: [facility] [place]    Assessment & Plan   1. Amenorrhea  -     Ambulatory Referral to Maternal Fetal Medicine; Future; Expected date: 2024  -     [facility] < 14 weeks single or first gestation level 1      History of Present Illness     Angelica Ratliff is a 33 y.o. female who presents for early pregnancy ultrasound.     VAVD x 1 - 9lb 1oz, no pregnancy complications  Mild breast tenderness, minimal nausea.     Overall feeling well.     Review of Systems    Objective     /64 (BP Location: Left arm, Patient Position: Sitting, Cuff Size: Standard)   Wt 85.4 kg (188 lb 3.2 oz)   LMP 2024   BMI 27.79 kg/m²     Physical Exam  Vitals and nursing note reviewed.   Constitutional:       General: She is not in acute distress.     Appearance: Normal appearance.   Genitourinary:     General: Normal vulva.      Vagina: No vaginal discharge.   Skin:     General: Skin is warm and dry.   Neurological:      Mental Status: She is alert and oriented to person, place, and time.   Psychiatric:         Mood and Affect: Mood normal.         Behavior: Behavior normal.       Administrative Statements

## 2024-07-05 NOTE — PROGRESS NOTES
Ultrasound Probe Disinfection    A transvaginal ultrasound was performed.   Prior to use, disinfection was performed with High Level Disinfection Process (Trophon)  Probe serial number  [id number]  was used    Niki Kaye MA  07/05/24  10:36 AM

## 2024-07-09 NOTE — PATIENT INSTRUCTIONS
Congratulations on your pregnancy!  We thank you for allowing us to participate in your care.    NEXT STEPS    Go to the lab to have your prenatal blood work competed if you have not already done so.  We ask that you have this blood work done prior to your first routine prenatal appointment.  There is a listing of [facility] and locations in your prenatal folder. You may also visit [email or web address] or call [phone].   Please be aware that some insurance companies may require you to go to a specific lab ([employer] or [employer]). You can verify this by contacting your insurance company.   If you have decided to be screened for CF and SMA genetic testing, these tests require prior authorization and scheduling.  Prior Authorization is not a guarantee of payment. There may be out of pocket expenses that includes copays, deductibles and or coinsurance for your individual plan.  Please call [phone] if our team has not contacted you in 7 business days.  If you have decided to have genetic testing done at Maternal Fetal Medicine, that will be scheduled by [facility]. You may have already scheduled this appointment.  If not, please call their office to schedule this appointment.  Based on the referral placed by our office, they will know how to schedule you appropriately.    Contact information for Maternal Fetal Medicine is located in your prenatal folder. The main phone number to their office is [phone]..   Return to our office for your first routine prenatal visit.   Some offices have multiple locations. Always check the address of your appointment to ensure you are going to the correct office.   If you experience any problems or concerns, call the office directly.  Remember to only use [employer] for none urgent concerns or questions.  Our doctors deliver at [facility] in [place]. The address is provided below.     [facility]  [address]   [address]     Click on the links below to review our Pregnancy Essential Guide.  [facility]regnancy Essentials Guide  [facility] ([email or web address])     Click on the link below to review [facility] locations.  [facility] Locations    Click on the link below for a virtual tour.   [facility] - Virtual Tour ([email or web address])    [employer] resource  Findhelp is a tool to connect you to community resources you may need.    Thank you,  Meghana LEE LPN  OB Nurse Navigator

## 2024-07-09 NOTE — PROGRESS NOTES
"OB INTAKE INTERVIEW 2024    Patient is 33 y.o. who presents for OB intake at 10w6d.  She is not accompanied by anyone during this encounter.  The father of her baby (Nikolas Ratliff) is involved in the pregnancy and he is 34 years old.      Patient's last menstrual period was 2024.  Ultrasound: Measured 10 weeks 5 days on 2024  Estimated Date of Delivery: 25 confirmed by dating ultrasound.    Signs/Symptoms of Pregnancy  Current pregnancy symptoms: breast tenderness, fatigue, frequent urination, and constipation  Headaches no  Cramping no  Spotting no  PICA cravings no    Diabetes-  Body mass index is 28.06 kg/m².  If patient has 1 or more, please order early 1 hour GTT  History of GDM no  BMI >35 no  History of PCOS or current metformin use no  History of LGA/macrosomic infant (4000g/9lbs) YES    If patient has 2 or more, please order early 1 hour GTT  BMI>30 no  AMA YES  First degree relative with type 2 diabetes no  History of chronic HTN, hyperlipidemia, elevated A1C no  High risk race (, , ,  or ) no    Hypertension- if you answer yes to any of the following, please order baseline preeclampsia labs (cbc, comprehensive metabolic panel, urine protein creatinine ratio, uric acid)  History of of chronic HTN no  History of gestational HTN no  History of preeclampsia, eclampsia, or HELLP syndrome no  History of diabetes no  History of lupus, autoimmune disease, kidney disease no    Thyroid- if yes order TSH with reflex T4  History of thyroid disease no    Bleeding Disorder or Hx of DVT-patient or first degree relative with history of. Order the following if not done previously.   (Factor V, antithrombin III, prothrombin gene mutation, protein C and S Ag, lupus anticoagulant, anticardiolipin, beta-2 glycoprotein)   no    OB/GYN-  History of abnormal pap smear:  Possibly \"years ago\" all paps normal since       Date of last pap smear " 3/31/21  History of HPV no  History of Herpes/HSV no  History of other STI (gonorrhea, chlamydia, trich) no  History of prior  YESx1  History of prior  no  History of  delivery prior to 36 weeks 6 days no  History of blood transfusion no  Ok for blood transfusion YES    Substance screening-   History of tobacco use YES  Currently using tobacco no  Currently using alcohol no  Presently using drugs no  Past drug use  YES-marijuana-not currently  IV drug use- no  Partner drug use no  Parent/Family drug use no  Substance Use Screen Level Low risk    MRSA Screening-   Does the pt have a hx of MRSA? no    Immunizations:  Discussed Tdap vaccine:  YES  Discussed COVID Vaccine:  YES    Genetic/MFM-  Do you or your partner have a history of any of the following in yourselves or first degree relatives?  Cystic fibrosis no  Spinal muscular atrophy no  Hemoglobinopathy/Sickle Cell/Thalassemia no  Fragile X Intellectual Disability no    If yes, discuss Carrier Screening and recommend consultation with MFM/Genetic Counseling and place specific [facility] Referral for.    If no, discuss Carrier Screening being completed once in a lifetime as a standard of care lab test. Place orders for Cystic Fibrosis Gene Test ([id number]) and Spinal Muscular Atrophy DNA ([id number]).  Patient was informed that prior authorization needs to be completed for these tests and this may take 7-10 business days.  Patient does desire testing for Cystic Fibrosis and Spinal Muscular Atrophy.    Appointment for Nuchal Translucency Ultrasound at [facility] is scheduled for 2024.    Interview education  [facility] Pregnancy Essentials Book reviewed, discussed and attached to their AVS:  YES    Nurse/Family Partnership-patient may qualify NO; referral placed NO     Prenatal lab work scripts YES    Extra labs ordered: Cystic Fibrosis gene test, Spinal muscular atrophy DNA, Hgb Fractionation Cascade, and 1 hour GTT    Aspirin/Preeclampsia Screen    Risk Level  Risk Factor Recommendation   LOW Prior Uncomplicated full-term delivery YES No Aspirin recommendation        MODERATE Nulliparity no Recommend low-dose aspirin if     BMI>30 no 2 or more moderate risk factors    Family History Preeclampsia (mother/sister) no     35yr old or greater YES      or Low Socioeconomic no     IVF Pregnancy  no     Personal History Risks (low birth weight, prior adverse preg outcome, >10yr preg interval) no         HIGH History of Preeclampsia no Recommend low-dose aspirin if     Multifetal gestation no 1 or more high risk factors    Chronic HTN no     Type 1 or 2 Diabetes no     Renal Disease no     Autoimmune Disease  no      Contraindications to ASA therapy:  NSAID/ ASA allergy: no  Nasal polyps: no  Asthma with history of ASA induced bronchospasm: no  Relative contraindications:  History of GI bleed: no  Active peptic ulcer disease: no  Severe hepatic dysfunction: no    Patient does not meet recommendation to take ASA 162mg during this pregnancy from 12-36wks to lower her risk of preeclampsia.      Mental Health:  History of depression: no  History of anxiety: no  EPDS Screen:  Negative   Score:  3      The patient has a history now or in prior pregnancy notable for: AMA.    Details that I feel the provider should be aware of: no additional concerns    PN1 visit scheduled. The patient was oriented to our practice and the navigator role.  Reviewed delivering physicians and [facility] for delivery. All questions were answered.    Interviewed by: JERRICA WILLIAM LPN

## 2024-07-10 ENCOUNTER — INITIAL PRENATAL (OUTPATIENT)
Age: 34
End: 2024-07-10

## 2024-07-10 VITALS
DIASTOLIC BLOOD PRESSURE: 64 MMHG | BODY MASS INDEX: 28.35 KG/M2 | WEIGHT: 191.4 LBS | SYSTOLIC BLOOD PRESSURE: 122 MMHG | HEIGHT: 69 IN

## 2024-07-10 DIAGNOSIS — O09.299 HISTORY OF MACROSOMIA IN INFANT IN PRIOR PREGNANCY, CURRENTLY PREGNANT: ICD-10-CM

## 2024-07-10 DIAGNOSIS — Z34.81 SUPERVISION OF NORMAL INTRAUTERINE PREGNANCY IN MULTIGRAVIDA IN FIRST TRIMESTER: Primary | ICD-10-CM

## 2024-07-10 DIAGNOSIS — Z13.71 SCREENING FOR GENETIC DISEASE CARRIER STATUS: ICD-10-CM

## 2024-07-10 DIAGNOSIS — O09.521 MULTIGRAVIDA OF ADVANCED MATERNAL AGE IN FIRST TRIMESTER: ICD-10-CM

## 2024-07-10 PROCEDURE — OBC

## 2024-07-17 ENCOUNTER — APPOINTMENT (OUTPATIENT)
Dept: LAB | Facility: CLINIC | Age: 34
End: 2024-07-17
Payer: COMMERCIAL

## 2024-07-17 DIAGNOSIS — O09.299 HISTORY OF MACROSOMIA IN INFANT IN PRIOR PREGNANCY, CURRENTLY PREGNANT: ICD-10-CM

## 2024-07-17 DIAGNOSIS — Z00.00 ANNUAL PHYSICAL EXAM: ICD-10-CM

## 2024-07-17 DIAGNOSIS — Z34.81 SUPERVISION OF NORMAL INTRAUTERINE PREGNANCY IN MULTIGRAVIDA IN FIRST TRIMESTER: ICD-10-CM

## 2024-07-17 DIAGNOSIS — O09.521 MULTIGRAVIDA OF ADVANCED MATERNAL AGE IN FIRST TRIMESTER: ICD-10-CM

## 2024-07-17 LAB
ABO GROUP BLD: NORMAL
ALBUMIN SERPL BCG-MCNC: 3.8 G/DL (ref 3.5–5)
ALP SERPL-CCNC: 34 U/L (ref 34–104)
ALT SERPL W P-5'-P-CCNC: 16 U/L (ref 7–52)
AMORPH URATE CRY URNS QL MICRO: ABNORMAL
ANION GAP SERPL CALCULATED.3IONS-SCNC: 13 MMOL/L (ref 4–13)
AST SERPL W P-5'-P-CCNC: 17 U/L (ref 13–39)
BACTERIA UR QL AUTO: ABNORMAL /HPF
BASOPHILS # BLD AUTO: 0.02 THOUSANDS/ÂΜL (ref 0–0.1)
BASOPHILS NFR BLD AUTO: 0 % (ref 0–1)
BILIRUB SERPL-MCNC: 0.3 MG/DL (ref 0.2–1)
BILIRUB UR QL STRIP: NEGATIVE
BLD GP AB SCN SERPL QL: NEGATIVE
BUN SERPL-MCNC: 12 MG/DL (ref 5–25)
CALCIUM SERPL-MCNC: 9.1 MG/DL (ref 8.4–10.2)
CHLORIDE SERPL-SCNC: 103 MMOL/L (ref 96–108)
CLARITY UR: ABNORMAL
CO2 SERPL-SCNC: 21 MMOL/L (ref 21–32)
COLOR UR: YELLOW
CREAT SERPL-MCNC: 0.55 MG/DL (ref 0.6–1.3)
EOSINOPHIL # BLD AUTO: 0.03 THOUSAND/ÂΜL (ref 0–0.61)
EOSINOPHIL NFR BLD AUTO: 0 % (ref 0–6)
ERYTHROCYTE [DISTWIDTH] IN BLOOD BY AUTOMATED COUNT: 13.8 % (ref 11.6–15.1)
GFR SERPL CREATININE-BSD FRML MDRD: 122 ML/MIN/1.73SQ M
GLUCOSE 1H P 50 G GLC PO SERPL-MCNC: 80 MG/DL (ref 70–134)
GLUCOSE SERPL-MCNC: 84 MG/DL (ref 65–140)
GLUCOSE UR STRIP-MCNC: NEGATIVE MG/DL
HBV SURFACE AB SER-ACNC: 53.3 MIU/ML
HBV SURFACE AG SER QL: NORMAL
HCT VFR BLD AUTO: 39.1 % (ref 34.8–46.1)
HCV AB SER QL: NORMAL
HGB BLD-MCNC: 12.8 G/DL (ref 11.5–15.4)
HGB UR QL STRIP.AUTO: NEGATIVE
HIV 1+2 AB+HIV1 P24 AG SERPL QL IA: NORMAL
HIV 2 AB SERPL QL IA: NORMAL
HIV1 AB SERPL QL IA: NORMAL
HIV1 P24 AG SERPL QL IA: NORMAL
IMM GRANULOCYTES # BLD AUTO: 0.04 THOUSAND/UL (ref 0–0.2)
IMM GRANULOCYTES NFR BLD AUTO: 0 % (ref 0–2)
KETONES UR STRIP-MCNC: NEGATIVE MG/DL
LEUKOCYTE ESTERASE UR QL STRIP: NEGATIVE
LYMPHOCYTES # BLD AUTO: 2.07 THOUSANDS/ÂΜL (ref 0.6–4.47)
LYMPHOCYTES NFR BLD AUTO: 22 % (ref 14–44)
MCH RBC QN AUTO: 29.9 PG (ref 26.8–34.3)
MCHC RBC AUTO-ENTMCNC: 32.7 G/DL (ref 31.4–37.4)
MCV RBC AUTO: 91 FL (ref 82–98)
MONOCYTES # BLD AUTO: 0.57 THOUSAND/ÂΜL (ref 0.17–1.22)
MONOCYTES NFR BLD AUTO: 6 % (ref 4–12)
MUCOUS THREADS UR QL AUTO: ABNORMAL
NEUTROPHILS # BLD AUTO: 6.66 THOUSANDS/ÂΜL (ref 1.85–7.62)
NEUTS SEG NFR BLD AUTO: 72 % (ref 43–75)
NITRITE UR QL STRIP: NEGATIVE
NON-SQ EPI CELLS URNS QL MICRO: ABNORMAL /HPF
NRBC BLD AUTO-RTO: 0 /100 WBCS
PH UR STRIP.AUTO: 5.5 [PH]
PLATELET # BLD AUTO: 286 THOUSANDS/UL (ref 149–390)
PMV BLD AUTO: 10.6 FL (ref 8.9–12.7)
POTASSIUM SERPL-SCNC: 3.9 MMOL/L (ref 3.5–5.3)
PROT SERPL-MCNC: 6.6 G/DL (ref 6.4–8.4)
PROT UR STRIP-MCNC: ABNORMAL MG/DL
RBC # BLD AUTO: 4.28 MILLION/UL (ref 3.81–5.12)
RBC #/AREA URNS AUTO: ABNORMAL /HPF
RH BLD: POSITIVE
RUBV IGG SERPL IA-ACNC: 54.7 IU/ML
SODIUM SERPL-SCNC: 137 MMOL/L (ref 135–147)
SP GR UR STRIP.AUTO: 1.03 (ref 1–1.03)
SPECIMEN EXPIRATION DATE: NORMAL
TREPONEMA PALLIDUM IGG+IGM AB [PRESENCE] IN SERUM OR PLASMA BY IMMUNOASSAY: NORMAL
TSH SERPL DL<=0.05 MIU/L-ACNC: 2.56 UIU/ML (ref 0.45–4.5)
UROBILINOGEN UR STRIP-ACNC: <2 MG/DL
WBC # BLD AUTO: 9.39 THOUSAND/UL (ref 4.31–10.16)
WBC #/AREA URNS AUTO: ABNORMAL /HPF

## 2024-07-17 PROCEDURE — 86706 HEP B SURFACE ANTIBODY: CPT

## 2024-07-17 PROCEDURE — 84443 ASSAY THYROID STIM HORMONE: CPT

## 2024-07-17 PROCEDURE — 86900 BLOOD TYPING SEROLOGIC ABO: CPT

## 2024-07-17 PROCEDURE — 86850 RBC ANTIBODY SCREEN: CPT

## 2024-07-17 PROCEDURE — 80053 COMPREHEN METABOLIC PANEL: CPT

## 2024-07-17 PROCEDURE — 85025 COMPLETE CBC W/AUTO DIFF WBC: CPT

## 2024-07-17 PROCEDURE — 86762 RUBELLA ANTIBODY: CPT

## 2024-07-17 PROCEDURE — 86901 BLOOD TYPING SEROLOGIC RH(D): CPT

## 2024-07-17 PROCEDURE — 81001 URINALYSIS AUTO W/SCOPE: CPT

## 2024-07-17 PROCEDURE — 82950 GLUCOSE TEST: CPT

## 2024-07-17 PROCEDURE — 87340 HEPATITIS B SURFACE AG IA: CPT

## 2024-07-17 PROCEDURE — 86803 HEPATITIS C AB TEST: CPT

## 2024-07-17 PROCEDURE — 83020 HEMOGLOBIN ELECTROPHORESIS: CPT

## 2024-07-17 PROCEDURE — 87086 URINE CULTURE/COLONY COUNT: CPT

## 2024-07-17 PROCEDURE — 86780 TREPONEMA PALLIDUM: CPT

## 2024-07-17 PROCEDURE — 36415 COLL VENOUS BLD VENIPUNCTURE: CPT

## 2024-07-17 PROCEDURE — 87389 HIV-1 AG W/HIV-1&-2 AB AG IA: CPT

## 2024-07-19 LAB — BACTERIA UR CULT: ABNORMAL

## 2024-07-22 ENCOUNTER — APPOINTMENT (OUTPATIENT)
Dept: LAB | Facility: CLINIC | Age: 34
End: 2024-07-22

## 2024-07-22 ENCOUNTER — APPOINTMENT (OUTPATIENT)
Dept: LAB | Facility: CLINIC | Age: 34
End: 2024-07-22
Payer: COMMERCIAL

## 2024-07-22 DIAGNOSIS — Z13.71 SCREENING FOR GENETIC DISEASE CARRIER STATUS: ICD-10-CM

## 2024-07-22 DIAGNOSIS — Z00.00 ANNUAL PHYSICAL EXAM: ICD-10-CM

## 2024-07-22 DIAGNOSIS — Z34.81 SUPERVISION OF NORMAL INTRAUTERINE PREGNANCY IN MULTIGRAVIDA IN FIRST TRIMESTER: ICD-10-CM

## 2024-07-22 DIAGNOSIS — O09.521 MULTIGRAVIDA OF ADVANCED MATERNAL AGE IN FIRST TRIMESTER: ICD-10-CM

## 2024-07-22 LAB
CHOLEST SERPL-MCNC: 179 MG/DL
HDLC SERPL-MCNC: 76 MG/DL
LDLC SERPL CALC-MCNC: 86 MG/DL (ref 0–100)
TRIGL SERPL-MCNC: 83 MG/DL

## 2024-07-22 PROCEDURE — 80061 LIPID PANEL: CPT

## 2024-07-22 PROCEDURE — 36415 COLL VENOUS BLD VENIPUNCTURE: CPT

## 2024-07-24 PROBLEM — O09.291: Status: ACTIVE | Noted: 2024-07-24

## 2024-07-24 PROBLEM — O09.291 HX OF MACROSOMIA IN INFANT IN PRIOR PREGNANCY, CURRENTLY PREGNANT, FIRST TRIMESTER: Status: ACTIVE | Noted: 2024-07-24

## 2024-07-24 PROBLEM — O09.521 MULTIGRAVIDA OF ADVANCED MATERNAL AGE IN FIRST TRIMESTER: Status: ACTIVE | Noted: 2024-07-24

## 2024-07-25 ENCOUNTER — ROUTINE PRENATAL (OUTPATIENT)
Dept: PERINATAL CARE | Facility: CLINIC | Age: 34
End: 2024-07-25
Payer: COMMERCIAL

## 2024-07-25 VITALS
BODY MASS INDEX: 29 KG/M2 | DIASTOLIC BLOOD PRESSURE: 72 MMHG | SYSTOLIC BLOOD PRESSURE: 126 MMHG | HEIGHT: 69 IN | HEART RATE: 76 BPM | WEIGHT: 195.8 LBS

## 2024-07-25 DIAGNOSIS — Z36.0 ENCOUNTER FOR ANTENATAL SCREENING FOR CHROMOSOMAL ANOMALIES: ICD-10-CM

## 2024-07-25 DIAGNOSIS — N91.2 AMENORRHEA: ICD-10-CM

## 2024-07-25 DIAGNOSIS — O09.521 MULTIGRAVIDA OF ADVANCED MATERNAL AGE IN FIRST TRIMESTER: Primary | ICD-10-CM

## 2024-07-25 DIAGNOSIS — O09.291: ICD-10-CM

## 2024-07-25 DIAGNOSIS — Z36.82 ENCOUNTER FOR (NT) NUCHAL TRANSLUCENCY SCAN: ICD-10-CM

## 2024-07-25 DIAGNOSIS — Z33.1 PREGNANT STATE, INCIDENTAL: ICD-10-CM

## 2024-07-25 DIAGNOSIS — Z3A.13 13 WEEKS GESTATION OF PREGNANCY: ICD-10-CM

## 2024-07-25 DIAGNOSIS — O09.291 HX OF MACROSOMIA IN INFANT IN PRIOR PREGNANCY, CURRENTLY PREGNANT, FIRST TRIMESTER: ICD-10-CM

## 2024-07-25 LAB
HGB A MFR BLD: 2.7 % (ref 1.8–3.2)
HGB A MFR BLD: 97.3 % (ref 96.4–98.8)
HGB F MFR BLD: 0 % (ref 0–2)
HGB FRACT BLD-IMP: NORMAL
HGB S MFR BLD: 0 %

## 2024-07-25 PROCEDURE — 76813 OB US NUCHAL MEAS 1 GEST: CPT | Performed by: OBSTETRICS & GYNECOLOGY

## 2024-07-25 PROCEDURE — 99204 OFFICE O/P NEW MOD 45 MIN: CPT | Performed by: NURSE PRACTITIONER

## 2024-07-25 PROCEDURE — 36415 COLL VENOUS BLD VENIPUNCTURE: CPT | Performed by: OBSTETRICS & GYNECOLOGY

## 2024-07-25 NOTE — PROGRESS NOTES
Patient chose to have [facility] [id number] Non-Invasive Prenatal Screen [id number] PLUS w/ SCA, WITH fetal sex.  Patient choose to be billed through insurance.     Patient given brochure and is aware [facility] will contact patient's insurance and coordinate coverage.  Provided [facility] contact information. General inquiries [phone], Cost estimates [phone] and [facility] Billing [phone]. Website [email or web address].     Blood collection tubes labeled with patient identifiers (name, medical record number, and date of birth).     Filled out [facility] order form. Patient chose to have blood drawn in our office at time of visit. NIPS was drawn from right arm with a butterfly needle by Caitlyn Ruano RN. .      If patient chose to have blood work drawn at a [facility] lab we requested patient notify MFM (via phone call or [employer] message) when blood collected so office can follow up on results.       Maternal Fetal Medicine will have results in approximately 5-7 business days and will call patient or notify via [employer].  Patient aware viewing lab result online will reveal fetal sex if ordered.    Patient verbalized understanding of all instructions and no questions at this time.

## 2024-07-25 NOTE — LETTER
"  .Date: 2024    Marley Abdi MD  [address] [address]    Patient: Angelica Ratliff   YOB: 1990   Date of Visit: 2024   Gestational age 13w0d   Nature of this communication: Routine       This patient was seen recently in our  office.  Please see ultrasound report under \"OB Procedures\" tab.  Please don't hesitate to contact our office with any concerns or questions.      Sincerely,      Rissa Santana MD/NEGAR ANTONIO  Attending Physician, Maternal-Fetal Medicine  [facility]        "

## 2024-07-25 NOTE — PROGRESS NOTES
"[facility]: Ms. Ratliff was seen today for nuchal translucency ultrasound.  See ultrasound report under \"OB Procedures\" tab.        Physical Exam  Constitutional:       General: She is not in acute distress.     Appearance: Normal appearance. She is not ill-appearing, toxic-appearing or diaphoretic.   HENT:      Head: Normocephalic and atraumatic.      Nose: No congestion or rhinorrhea.   Eyes:      General: No scleral icterus.        Right eye: No discharge.         Left eye: No discharge.      Extraocular Movements: Extraocular movements intact.      Conjunctiva/sclera: Conjunctivae normal.   Pulmonary:      Effort: Pulmonary effort is normal. No respiratory distress.   Musculoskeletal:      Cervical back: Normal range of motion.   Skin:     Coloration: Skin is not jaundiced or pale.      Findings: No erythema, lesion or rash.   Neurological:      General: No focal deficit present.      Mental Status: She is alert and oriented to person, place, and time.   Psychiatric:         Mood and Affect: Mood normal.         Behavior: Behavior normal.     Increased maternal medical risks with advanced maternal age include gestational diabetes, hypertensive complications,  delivery,  delivery, hepatic complications including acute fatty liver of pregnancy and HELLP syndrome, and peripartum cardiomyopathy.  These risks can be mitigated but not eliminated by optimizing maternal medical health preconception, aspirin prophylaxis when indicated (prescribed today), and optimizing weight gain.  We generally advise a third trimester growth assessment for the indication of advanced maternal age.    We discussed her history of fetal macrosomia with vacuum at 41 weeks.  She passed an early 1 hour Glucola screen on 2024-80.      They want NIPS with gender.     Medical decision making is moderate (diagnosis moderate, data moderate and risk moderate).    Please don't hesitate to contact our " office with any concerns or questions.  -PACO Castillo

## 2024-07-31 ENCOUNTER — NURSE TRIAGE (OUTPATIENT)
Age: 34
End: 2024-07-31

## 2024-07-31 ENCOUNTER — TELEPHONE (OUTPATIENT)
Age: 34
End: 2024-07-31

## 2024-07-31 ENCOUNTER — INITIAL PRENATAL (OUTPATIENT)
Age: 34
End: 2024-07-31
Payer: COMMERCIAL

## 2024-07-31 VITALS — SYSTOLIC BLOOD PRESSURE: 114 MMHG | BODY MASS INDEX: 29.09 KG/M2 | DIASTOLIC BLOOD PRESSURE: 68 MMHG | WEIGHT: 198.4 LBS

## 2024-07-31 DIAGNOSIS — Z34.82 PRENATAL CARE, SUBSEQUENT PREGNANCY, SECOND TRIMESTER: Primary | ICD-10-CM

## 2024-07-31 DIAGNOSIS — Z12.4 SCREENING FOR CERVICAL CANCER: ICD-10-CM

## 2024-07-31 DIAGNOSIS — K64.8 OTHER HEMORRHOIDS: ICD-10-CM

## 2024-07-31 DIAGNOSIS — Z33.1 INCIDENTAL PREGNANCY: ICD-10-CM

## 2024-07-31 DIAGNOSIS — Z11.3 SCREENING FOR STDS (SEXUALLY TRANSMITTED DISEASES): ICD-10-CM

## 2024-07-31 DIAGNOSIS — Z36.9 ENCOUNTER FOR ANTENATAL SCREENING: ICD-10-CM

## 2024-07-31 DIAGNOSIS — Z01.419 ENCOUNTER FOR ANNUAL ROUTINE GYNECOLOGICAL EXAMINATION: ICD-10-CM

## 2024-07-31 LAB
SL AMB  POCT GLUCOSE, UA: NORMAL
SL AMB POCT URINE PROTEIN: NORMAL

## 2024-07-31 PROCEDURE — G0476 HPV COMBO ASSAY CA SCREEN: HCPCS | Performed by: OBSTETRICS & GYNECOLOGY

## 2024-07-31 PROCEDURE — G0145 SCR C/V CYTO,THINLAYER,RESCR: HCPCS | Performed by: OBSTETRICS & GYNECOLOGY

## 2024-07-31 PROCEDURE — 81002 URINALYSIS NONAUTO W/O SCOPE: CPT | Performed by: OBSTETRICS & GYNECOLOGY

## 2024-07-31 PROCEDURE — 87591 N.GONORRHOEAE DNA AMP PROB: CPT | Performed by: OBSTETRICS & GYNECOLOGY

## 2024-07-31 PROCEDURE — 87491 CHLMYD TRACH DNA AMP PROBE: CPT | Performed by: OBSTETRICS & GYNECOLOGY

## 2024-07-31 PROCEDURE — PNV: Performed by: OBSTETRICS & GYNECOLOGY

## 2024-07-31 RX ORDER — NYSTATIN AND TRIAMCINOLONE ACETONIDE 100000; 1 [USP'U]/G; MG/G
OINTMENT TOPICAL 2 TIMES DAILY
Qty: 60 G | Refills: 3 | Status: SHIPPED | OUTPATIENT
Start: 2024-07-31

## 2024-07-31 RX ORDER — LIDOCAINE AND PRILOCAINE 25; 25 MG/G; MG/G
CREAM TOPICAL AS NEEDED
Qty: 30 G | Refills: 4 | Status: SHIPPED | OUTPATIENT
Start: 2024-07-31 | End: 2025-07-31

## 2024-07-31 NOTE — PATIENT INSTRUCTIONS
Pap and GC/CT done today.   [id number] pending.  For MSaFP 16-18wks  Hemorrhoids with mild yeast infection:  for topical Mycolog and lidocaine.  Also use Tucks pads as a pressure dressing.  Discussed decreased activities that would increase pelvic pressure.  Colace 2-3x/day.  Discussed hygroscopic effects of table sugar.  Called Dr. Castro's office who will reach out to pt and have her f/u with her.    RTO for OB appt as scheduled.    Patient verbalized understanding of today's discussion with all questions and concerns addressed to her satisfaction.

## 2024-07-31 NOTE — TELEPHONE ENCOUNTER
Received call from Dr. Man regarding patients troubles with hemorrhoids while 3 months pregnant. She asked our office give patient a call. Called patient and transferred to nurse triage for further recommendations.

## 2024-07-31 NOTE — PROGRESS NOTES
Initial PN 1 visit.  13wks/6days. SHAHIDA: 1/30/2025.  Last annual visit: 9/18/23  Last pap: 3/31/21 Pap: neg/HPV: neg  GC/CH collected: yes  PN 1 labs completed.  Blood type: O positive  1 hour GTT: completed- (80)  Blue information packet given: yes  Nuchal: 7/25/24  Level II US scheduled 9/9/24  She has hemorrhoids; has tried the ointments that were ordered, but they are not helping.  She denies spotting or cramping  She also would like to discuss exercise

## 2024-07-31 NOTE — PROGRESS NOTES
Assessment/Plan:      Pregnancy at 13 and 6/7 weeks       Initial labs drawn. Yes  Patient is taking Prenatal vitamins.  Problem list reviewed and updated.  AFP3 discussed: requested.  Role of ultrasound in pregnancy discussed; fetal survey: requested.  Amniocentesis discussed: not indicated.  Follow up in 4 weeks.    Plan:   Pap and GC/CT done today.   [id number] pending.  For MSaFP 16-18wks  Hemorrhoids with mild yeast infection:  for topical Mycolog and lidocaine.  Also use Tucks pads as a pressure dressing.  Discussed decreased activities that would increase pelvic pressure.  Colace 2-3x/day.  Discussed hygroscopic effects of table sugar.  Called Dr. Castro's office who will reach out to pt and have her f/u with her.    RTO for OB appt as scheduled.    Patient verbalized understanding of today's discussion with all questions and concerns addressed to her satisfaction.          Jeanna Ratliff is being seen today for her first obstetrical visit.  This is a planned pregnancy. She is at 13w6d gestation. Her obstetrical history is significant for advanced maternal age. Relationship with FOB: spouse, living together. Patient does intend to breast feed. Pregnancy history fully reviewed.      Hemorrhoids are giving her so much problem.       The following portions of the patient's history were reviewed and updated as appropriate: allergies, current medications, past family history, past medical history, past social history, past surgical history, and problem list.        Review of Systems    Pertinent items are noted in HPI.          Objective     /68   Wt 90 kg (198 lb 6.4 oz)   LMP 04/25/2024   BMI 29.09 kg/m²     General Appearance:    Alert, cooperative, no distress, appears stated age   Head:    Normocephalic, without obvious abnormality, atraumatic   Eyes:    PERRL, conjunctiva/corneas clear, EOM's intact, fundi     benign, both eyes   Ears:    Normal TM's and external ear canals, both  ears   Nose:   Nares normal, septum midline, mucosa normal, no drainage    or sinus tenderness   Throat:   Lips, mucosa, and tongue normal; teeth and gums normal   Neck:   Supple, symmetrical, trachea midline, no adenopathy;     thyroid:  no enlargement/tenderness/nodules; no carotid    bruit or JVD   Back:     Symmetric, no curvature, ROM normal, no CVA tenderness   Lungs:     Clear to auscultation bilaterally, respirations unlabored   Chest Wall:    No tenderness or deformity    Heart:    Regular rate and rhythm, S1 and S2 normal, no murmur, rub   or gallop   Breast Exam:    No tenderness, masses, or nipple abnormality   Abdomen:     Soft, non-tender, bowel sounds active all four quadrants,     no masses, no organomegaly   Genitalia:    Normal female without lesion, discharge or tenderness   Rectal:    Normal tone, no masses or tenderness; guaiac negative stool   Extremities:   Extremities normal, atraumatic, no cyanosis or edema   Pulses:   2+ and symmetric all extremities   Skin:   Skin color, texture, turgor normal, no rashes or lesions   Lymph nodes:   Cervical, supraclavicular, and axillary nodes normal   Neurologic:   CNII-XII intact, normal strength, sensation and reflexes     throughout         FHR noted at 150 bpm.    Vaginal bleeding Noted.   Vaginal discharge: normal.          50% of 20 min visit spent on counseling and coordination of care.       KATIA Fong MD, FACOG

## 2024-07-31 NOTE — TELEPHONE ENCOUNTER
Pt calling in, reports she went to see her OB today and was advised to f/u with Dr Castro due to ongoing hemorrhoidal pain and mild rectal bleeding.   Her OB changed her rectal cream to lidocaine- prilocaine instead. I advised pt to continue benefiber BID, colace and increase water intake. She understood.   I scheduled pt for a follow up but if symptoms are better pt will cancel appt.     Additional Information   Affirmative: The patient has BRBPR with wiping, not mixed with stoo,l AND feels a hemorrhoid    Answer Questions - Hemorrhoids  When did your symptoms start:  ongoing since June     Is there bright red blood when wiping or streaks in the stool: yes    How many occurrences have you had in the last 24 hours: none     Have your symptoms: stable    Do you feel this is a mild, moderate ,or severe amount of blood:  mild     Are you experiencing more diarrhea or constipation:      Do you have anything prescribed or over the counter for this:     Are you experiencing any additional symptoms such as: no    Protocols used: Hemorrhoid

## 2024-08-01 LAB
CFDNA.FET/CFDNA.TOTAL SFR FETUS: NORMAL %
CITATION REF LAB TEST: NORMAL
FET 13+18+21+X+Y ANEUP PLAS.CFDNA: NEGATIVE
FET CHR 21 TS PLAS.CFDNA QL: NEGATIVE
FET CHR 21 TS PLAS.CFDNA QL: NEGATIVE
FET MS X RISK WBC.DNA+CFDNA QL: NOT DETECTED
FET SEX PLAS.CFDNA DOSAGE CFDNA: NORMAL
FET TS 13 RISK PLAS.CFDNA QL: NEGATIVE
FET X + Y ANEUP RISK PLAS.CFDNA SEQ-IMP: NOT DETECTED
GA EST FROM CONCEPTION DATE: NORMAL D
GESTATIONAL AGE > 9:: YES
HPV HR 12 DNA CVX QL NAA+PROBE: NEGATIVE
HPV16 DNA CVX QL NAA+PROBE: NEGATIVE
HPV18 DNA CVX QL NAA+PROBE: NEGATIVE
LAB DIRECTOR NAME PROVIDER: NORMAL
LAB DIRECTOR NAME PROVIDER: NORMAL
LABORATORY COMMENT REPORT: NORMAL
LIMITATIONS OF THE TEST: NORMAL
NEGATIVE PREDICTIVE VALUE: NORMAL
PERFORMANCE CHARACTERISTICS: NORMAL
POSITIVE PREDICTIVE VALUE: NORMAL
REF LAB TEST METHOD: NORMAL
SL AMB NOTE:: NORMAL
TEST PERFORMANCE INFO SPEC: NORMAL

## 2024-08-02 LAB
C TRACH DNA SPEC QL NAA+PROBE: NEGATIVE
N GONORRHOEA DNA SPEC QL NAA+PROBE: NEGATIVE
SCAN RESULT: NORMAL
SPECIMEN SOURCE: NORMAL

## 2024-08-05 LAB
LAB AP GYN PRIMARY INTERPRETATION: NORMAL
LAB AP LMP: NORMAL
Lab: NORMAL

## 2024-08-28 ENCOUNTER — APPOINTMENT (OUTPATIENT)
Dept: LAB | Facility: CLINIC | Age: 34
End: 2024-08-28
Payer: COMMERCIAL

## 2024-08-28 ENCOUNTER — ROUTINE PRENATAL (OUTPATIENT)
Age: 34
End: 2024-08-28
Payer: COMMERCIAL

## 2024-08-28 VITALS — WEIGHT: 203.4 LBS | BODY MASS INDEX: 29.82 KG/M2 | DIASTOLIC BLOOD PRESSURE: 56 MMHG | SYSTOLIC BLOOD PRESSURE: 104 MMHG

## 2024-08-28 DIAGNOSIS — Z33.1 INCIDENTAL PREGNANCY: ICD-10-CM

## 2024-08-28 DIAGNOSIS — O09.522 MULTIGRAVIDA OF ADVANCED MATERNAL AGE IN SECOND TRIMESTER: Primary | ICD-10-CM

## 2024-08-28 DIAGNOSIS — O09.292 HISTORY OF MACROSOMIA IN INFANT IN PRIOR PREGNANCY, CURRENTLY PREGNANT IN SECOND TRIMESTER: ICD-10-CM

## 2024-08-28 DIAGNOSIS — Z34.82 PRENATAL CARE, SUBSEQUENT PREGNANCY, SECOND TRIMESTER: ICD-10-CM

## 2024-08-28 DIAGNOSIS — O09.291: ICD-10-CM

## 2024-08-28 DIAGNOSIS — Z36.9 ENCOUNTER FOR ANTENATAL SCREENING: ICD-10-CM

## 2024-08-28 LAB
SL AMB  POCT GLUCOSE, UA: NORMAL
SL AMB POCT URINE PROTEIN: NORMAL

## 2024-08-28 PROCEDURE — PNV: Performed by: NURSE PRACTITIONER

## 2024-08-28 PROCEDURE — 36415 COLL VENOUS BLD VENIPUNCTURE: CPT

## 2024-08-28 PROCEDURE — 82105 ALPHA-FETOPROTEIN SERUM: CPT

## 2024-08-28 PROCEDURE — 81002 URINALYSIS NONAUTO W/O SCOPE: CPT | Performed by: NURSE PRACTITIONER

## 2024-08-28 NOTE — PROGRESS NOTES
34 yo  at 17w6d gestation.  AMA, hemorrhoids (referred to GI, doing better)    PN labs normal  NIPT low risk; having a girl!  MSAFP order placed- will do today  Anatomy US 24    Reporting some right round ligament pain.  + quickening; denies LOF/VB.  S=D, normal FHTs, normal BP    RV 4w

## 2024-08-28 NOTE — PROGRESS NOTES
17w6d  NIPS negative  AFP already ordered. Reminded to complete  Level II scheduled 9/9/24  She states she has right sided cramping (thinks it's from coughing)  +fetal flutters- yes

## 2024-08-30 LAB
2ND TRIMESTER 4 SCREEN SERPL-IMP: NORMAL
AFP ADJ MOM SERPL: 1.15
AFP INTERP AMN-IMP: NORMAL
AFP INTERP SERPL-IMP: NORMAL
AFP INTERP SERPL-IMP: NORMAL
AFP SERPL-MCNC: 41.1 NG/ML
AGE AT DELIVERY: 35.3 YR
GA METHOD: NORMAL
GA: 17.9 WEEKS
IDDM PATIENT QL: NO
MULTIPLE PREGNANCY: NO
NEURAL TUBE DEFECT RISK FETUS: 7603 %

## 2024-09-03 ENCOUNTER — TELEPHONE (OUTPATIENT)
Age: 34
End: 2024-09-03

## 2024-09-03 NOTE — TELEPHONE ENCOUNTER
Overall how are you doing? Patient states she is doing well.    Compliant with routine OB care appointments? Yes    Have you completed your 1st trimester labs? Yes    If you had NIPS with MFM, do you have a order for MSAFP? Yes   Can be completed 15w-22w9d, ideally 16w-18w    Have you seen MFM and do you have your detailed US scheduled? Yes    Pregnancy Education-have you had a chance to review the classes offered and registered? Yes, patient is not interested in prenatal classes at this time.     EPDS Score:  1

## 2024-09-09 ENCOUNTER — TELEPHONE (OUTPATIENT)
Dept: PERINATAL CARE | Facility: CLINIC | Age: 34
End: 2024-09-09

## 2024-09-09 ENCOUNTER — ROUTINE PRENATAL (OUTPATIENT)
Dept: PERINATAL CARE | Facility: OTHER | Age: 34
End: 2024-09-09
Payer: COMMERCIAL

## 2024-09-09 VITALS
DIASTOLIC BLOOD PRESSURE: 70 MMHG | HEIGHT: 69 IN | WEIGHT: 240 LBS | BODY MASS INDEX: 35.55 KG/M2 | SYSTOLIC BLOOD PRESSURE: 124 MMHG | HEART RATE: 98 BPM

## 2024-09-09 DIAGNOSIS — Z3A.19 19 WEEKS GESTATION OF PREGNANCY: ICD-10-CM

## 2024-09-09 DIAGNOSIS — O09.521 MULTIGRAVIDA OF ADVANCED MATERNAL AGE IN FIRST TRIMESTER: Primary | ICD-10-CM

## 2024-09-09 DIAGNOSIS — Z36.86 ENCOUNTER FOR ANTENATAL SCREENING FOR CERVICAL LENGTH: ICD-10-CM

## 2024-09-09 DIAGNOSIS — O09.292 HX OF OLIGOHYDRAMNIOS IN PRIOR PREGNANCY, CURRENTLY PREGNANT, SECOND TRIMESTER: ICD-10-CM

## 2024-09-09 DIAGNOSIS — Z36.3 ENCOUNTER FOR ANTENATAL SCREENING FOR MALFORMATIONS: ICD-10-CM

## 2024-09-09 DIAGNOSIS — O09.299 PRIOR FETAL MACROSOMIA, ANTEPARTUM: ICD-10-CM

## 2024-09-09 PROCEDURE — 76817 TRANSVAGINAL US OBSTETRIC: CPT | Performed by: OBSTETRICS & GYNECOLOGY

## 2024-09-09 PROCEDURE — 76811 OB US DETAILED SNGL FETUS: CPT | Performed by: OBSTETRICS & GYNECOLOGY

## 2024-09-09 PROCEDURE — 99213 OFFICE O/P EST LOW 20 MIN: CPT | Performed by: NURSE PRACTITIONER

## 2024-09-09 NOTE — LETTER
2024     Marley Abdi MD  [address] [address]    Patient: Angelica Ratliff   YOB: 1990   Date of Visit: 2024       Dear Dr. Abdi:    Thank you for referring Angelica Ratliff to me for evaluation. Below are my notes for this consultation.    If you have questions, please do not hesitate to call me. I look forward to following your patient along with you.         Sincerely,        aMrie Burnett MD        CC: No Recipients    Marie Burnett MD  2024  3:51 PM  Sign when Signing Visit  Angelica Ratliff has no complaints today. She is here at 19w4d  for detailed anatomic survey. She reports fetal movements and does not report any vaginal bleeding or signs of labor.  Her recently completed fetal testing revealed a low risk NIPT and negative MSAFP screen (MoM 1.15).      Problem list:  1.  Prior macrosomia  2.  Advanced maternal age  3.  Oligohydramnios in prior pregnancy  4.  Vacuum assisted delivery     Ultrasound findings:  A viable putnam intrauterine pregnancy is seen on today's ultrasound, measuring consistent with established EDC.  The fetal anatomic survey is complete, and no fetal anomalies are suspected.  Amniotic fluid and placenta are within normal limits.    Transvaginal cervical length is within normal limits (4.38 cm), indicating low risk for  birth.     Pregnancy ultrasound has limitations and is unable to detect all forms of fetal congenital abnormalities.       Specific counseling was provided on the following problems:  1.  Her MSAFP screen and nips results were reviewed  2.  Given her history of oligohydramnios in a prior pregnancy, recommend late third trimester ultrasound for fluid.     Follow up recommended:   1.  Growth scan at 32 weeks      Split-shared decision-making between PACO Waddell and myself was utilized, with the majority of the time spent by NEGAR Waddell.  Medical decision-making for this  encounter was low (diagnosis low, data limited and risk low).     Procedures that were completed today were charged separately.     I reviewed the ultrasound pictures and recommended the medical decision making transcribed in the care of this patient.         Marie Burnett MD

## 2024-09-09 NOTE — TELEPHONE ENCOUNTER
Incoming call from patient. States that she was placed on hold by previous attendant for 20 minutes and no one got back to her. She received notification that her appointment for today was changed - location and time. Patient is upset due to the lack of communication. States that it is unprofessional. RN apologized for lack of timely communication regarding this matter. Patient states she will be filing report.

## 2024-09-09 NOTE — PROGRESS NOTES
Angelica Ratliff has no complaints today. She is here at 19w4d  for detailed anatomic survey. She reports fetal movements and does not report any vaginal bleeding or signs of labor.  Her recently completed fetal testing revealed a low risk NIPT and negative MSAFP screen (MoM 1.15).      Problem list:  1.  Prior macrosomia  2.  Advanced maternal age  3.  Oligohydramnios in prior pregnancy  4.  Vacuum assisted delivery     Ultrasound findings:  A viable putnam intrauterine pregnancy is seen on today's ultrasound, measuring consistent with established EDC.  The fetal anatomic survey is complete, and no fetal anomalies are suspected.  Amniotic fluid and placenta are within normal limits.    Transvaginal cervical length is within normal limits (4.38 cm), indicating low risk for  birth.     Pregnancy ultrasound has limitations and is unable to detect all forms of fetal congenital abnormalities.       Specific counseling was provided on the following problems:  1.  Her MSAFP screen and nips results were reviewed  2.  Given her history of oligohydramnios in a prior pregnancy, recommend late third trimester ultrasound for fluid.     Follow up recommended:   1.  Growth scan at 32 weeks      Split-shared decision-making between PACO Waddell and myself was utilized, with the majority of the time spent by NEGAR Waddell.  Medical decision-making for this encounter was low (diagnosis low, data limited and risk low).     Procedures that were completed today were charged separately.     I reviewed the ultrasound pictures and recommended the medical decision making transcribed in the care of this patient.         Marie Burnett MD

## 2024-09-24 ENCOUNTER — ROUTINE PRENATAL (OUTPATIENT)
Age: 34
End: 2024-09-24
Payer: COMMERCIAL

## 2024-09-24 VITALS — WEIGHT: 209.6 LBS | BODY MASS INDEX: 30.95 KG/M2 | DIASTOLIC BLOOD PRESSURE: 62 MMHG | SYSTOLIC BLOOD PRESSURE: 122 MMHG

## 2024-09-24 DIAGNOSIS — Z34.82 PRENATAL CARE, SUBSEQUENT PREGNANCY, SECOND TRIMESTER: Primary | ICD-10-CM

## 2024-09-24 PROBLEM — O09.299 HX OF DELIVERY BY VACUUM EXTRACTION, CURRENTLY PREGNANT: Status: ACTIVE | Noted: 2024-07-24

## 2024-09-24 PROBLEM — O09.299 HX OF MACROSOMIA IN INFANT IN PRIOR PREGNANCY, CURRENTLY PREGNANT: Status: ACTIVE | Noted: 2024-07-24

## 2024-09-24 PROBLEM — O09.522 MULTIGRAVIDA OF ADVANCED MATERNAL AGE IN SECOND TRIMESTER: Status: ACTIVE | Noted: 2024-07-24

## 2024-09-24 PROBLEM — O09.299 HX OF OLIGOHYDRAMNIOS IN PRIOR PREGNANCY, CURRENTLY PREGNANT: Status: ACTIVE | Noted: 2024-09-09

## 2024-09-24 LAB
SL AMB  POCT GLUCOSE, UA: NORMAL
SL AMB POCT URINE PROTEIN: NORMAL

## 2024-09-24 PROCEDURE — PNV: Performed by: OBSTETRICS & GYNECOLOGY

## 2024-09-24 PROCEDURE — 81002 URINALYSIS NONAUTO W/O SCOPE: CPT | Performed by: OBSTETRICS & GYNECOLOGY

## 2024-09-24 NOTE — PROGRESS NOTES
21w5d  NIPS negative  AFP completed  Level II completed  Has Growth scan scheduled for 32 wks  Denies Leaking of Fluid, vaginal bleeding, contractions  +Fetal Movement

## 2024-10-23 ENCOUNTER — ROUTINE PRENATAL (OUTPATIENT)
Age: 34
End: 2024-10-23
Payer: COMMERCIAL

## 2024-10-23 VITALS — BODY MASS INDEX: 31.4 KG/M2 | WEIGHT: 212.6 LBS | DIASTOLIC BLOOD PRESSURE: 84 MMHG | SYSTOLIC BLOOD PRESSURE: 118 MMHG

## 2024-10-23 DIAGNOSIS — Z34.82 PRENATAL CARE, SUBSEQUENT PREGNANCY, SECOND TRIMESTER: Primary | ICD-10-CM

## 2024-10-23 DIAGNOSIS — Z11.3 SCREENING FOR STD (SEXUALLY TRANSMITTED DISEASE): ICD-10-CM

## 2024-10-23 LAB
SL AMB  POCT GLUCOSE, UA: NEGATIVE
SL AMB POCT URINE PROTEIN: NEGATIVE

## 2024-10-23 PROCEDURE — 81002 URINALYSIS NONAUTO W/O SCOPE: CPT | Performed by: OBSTETRICS & GYNECOLOGY

## 2024-10-23 PROCEDURE — PNV: Performed by: OBSTETRICS & GYNECOLOGY

## 2024-10-23 NOTE — PROGRESS NOTES
Angleica is doing well.  NO bleeding, LOF, contractions.   Baby is active.   28wk labs ordered.   Declines flu shot.   32wk growth scan scheduled.   Continues to exercise/work out regularly - this was encouraged.

## 2024-10-23 NOTE — PROGRESS NOTES
25w6d  24wk labs completed  Growth scan scheduled for 12/3/24     Denies loss of fluid, contractions or bleeding  +Fetal movement

## 2024-11-07 ENCOUNTER — APPOINTMENT (OUTPATIENT)
Dept: LAB | Facility: CLINIC | Age: 34
End: 2024-11-07
Payer: COMMERCIAL

## 2024-11-07 DIAGNOSIS — Z34.82 PRENATAL CARE, SUBSEQUENT PREGNANCY, SECOND TRIMESTER: ICD-10-CM

## 2024-11-07 DIAGNOSIS — Z11.3 SCREENING FOR STD (SEXUALLY TRANSMITTED DISEASE): ICD-10-CM

## 2024-11-07 LAB
BASOPHILS # BLD AUTO: 0.02 THOUSANDS/ÂΜL (ref 0–0.1)
BASOPHILS NFR BLD AUTO: 0 % (ref 0–1)
EOSINOPHIL # BLD AUTO: 0.01 THOUSAND/ÂΜL (ref 0–0.61)
EOSINOPHIL NFR BLD AUTO: 0 % (ref 0–6)
ERYTHROCYTE [DISTWIDTH] IN BLOOD BY AUTOMATED COUNT: 13.4 % (ref 11.6–15.1)
GLUCOSE 1H P 50 G GLC PO SERPL-MCNC: 89 MG/DL (ref 70–134)
HCT VFR BLD AUTO: 40.7 % (ref 34.8–46.1)
HGB BLD-MCNC: 12.9 G/DL (ref 11.5–15.4)
IMM GRANULOCYTES # BLD AUTO: 0.06 THOUSAND/UL (ref 0–0.2)
IMM GRANULOCYTES NFR BLD AUTO: 1 % (ref 0–2)
LYMPHOCYTES # BLD AUTO: 2.04 THOUSANDS/ÂΜL (ref 0.6–4.47)
LYMPHOCYTES NFR BLD AUTO: 20 % (ref 14–44)
MCH RBC QN AUTO: 29.6 PG (ref 26.8–34.3)
MCHC RBC AUTO-ENTMCNC: 31.7 G/DL (ref 31.4–37.4)
MCV RBC AUTO: 93 FL (ref 82–98)
MONOCYTES # BLD AUTO: 0.49 THOUSAND/ÂΜL (ref 0.17–1.22)
MONOCYTES NFR BLD AUTO: 5 % (ref 4–12)
NEUTROPHILS # BLD AUTO: 7.38 THOUSANDS/ÂΜL (ref 1.85–7.62)
NEUTS SEG NFR BLD AUTO: 74 % (ref 43–75)
NRBC BLD AUTO-RTO: 0 /100 WBCS
PLATELET # BLD AUTO: 258 THOUSANDS/UL (ref 149–390)
PMV BLD AUTO: 10.6 FL (ref 8.9–12.7)
RBC # BLD AUTO: 4.36 MILLION/UL (ref 3.81–5.12)
TREPONEMA PALLIDUM IGG+IGM AB [PRESENCE] IN SERUM OR PLASMA BY IMMUNOASSAY: NORMAL
WBC # BLD AUTO: 10 THOUSAND/UL (ref 4.31–10.16)

## 2024-11-07 PROCEDURE — 85025 COMPLETE CBC W/AUTO DIFF WBC: CPT

## 2024-11-07 PROCEDURE — 36415 COLL VENOUS BLD VENIPUNCTURE: CPT

## 2024-11-07 PROCEDURE — 86780 TREPONEMA PALLIDUM: CPT

## 2024-11-19 ENCOUNTER — TELEPHONE (OUTPATIENT)
Dept: OBGYN CLINIC | Facility: CLINIC | Age: 34
End: 2024-11-19

## 2024-11-20 ENCOUNTER — ROUTINE PRENATAL (OUTPATIENT)
Age: 34
End: 2024-11-20
Payer: COMMERCIAL

## 2024-11-20 VITALS — BODY MASS INDEX: 31.41 KG/M2 | WEIGHT: 212.7 LBS | SYSTOLIC BLOOD PRESSURE: 112 MMHG | DIASTOLIC BLOOD PRESSURE: 68 MMHG

## 2024-11-20 DIAGNOSIS — Z34.83 PRENATAL CARE, SUBSEQUENT PREGNANCY, THIRD TRIMESTER: Primary | ICD-10-CM

## 2024-11-20 LAB
SL AMB  POCT GLUCOSE, UA: NORMAL
SL AMB POCT URINE PROTEIN: NORMAL

## 2024-11-20 PROCEDURE — 81002 URINALYSIS NONAUTO W/O SCOPE: CPT | Performed by: OBSTETRICS & GYNECOLOGY

## 2024-11-20 PROCEDURE — PNV: Performed by: OBSTETRICS & GYNECOLOGY

## 2024-11-20 NOTE — PROGRESS NOTES
PN visit  29w6d  She denies cramping, spotting or contractions  Delivery consent previously signed  Yellow folder given at last visit  Declines vaccines  28 week labs completed; passed 1 hour GTT  US scheduled 12/3/24

## 2024-11-20 NOTE — PROGRESS NOTES
Reports +FM.  Denies VB, LOF, BH ctx.     28wk labs reviewed, normal 1hr.      AMA:  For repeat growth @ 32wks, 12/3.  H/o LGA.      PTL, SILVIO, wt gain reviewed.

## 2024-11-21 NOTE — TELEPHONE ENCOUNTER
Overall how are you feeling? Patient states she is doing well.    Compliant with routine OB appointments? Yes  Have you completed your 3rd trimester lab work? Yes    Have you reviewed the contents of 3rd trimester folder from office?  No, patient will receive folder at next prenatal appointment and contents will be reviewed at that time.   Have you decided on a pediatrician? Yes     If yes, who:  [facility]    If no, what are you looking for and request sent for outreach.  Questions on paperwork to go back to office? Patient will receive folder and forms at next prenatal appointment.     Questions on the baby birth certificate forms? Patient will receive folder and forms at next prenatal appointment.      Sent link for the Hospital Readiness Video via [employer]

## 2024-12-03 ENCOUNTER — TELEPHONE (OUTPATIENT)
Dept: PERINATAL CARE | Facility: CLINIC | Age: 34
End: 2024-12-03

## 2024-12-03 ENCOUNTER — ROUTINE PRENATAL (OUTPATIENT)
Age: 34
End: 2024-12-03
Payer: COMMERCIAL

## 2024-12-03 ENCOUNTER — ULTRASOUND (OUTPATIENT)
Dept: PERINATAL CARE | Facility: CLINIC | Age: 34
End: 2024-12-03
Payer: COMMERCIAL

## 2024-12-03 VITALS — DIASTOLIC BLOOD PRESSURE: 82 MMHG | WEIGHT: 214.8 LBS | BODY MASS INDEX: 31.72 KG/M2 | SYSTOLIC BLOOD PRESSURE: 110 MMHG

## 2024-12-03 VITALS
HEART RATE: 83 BPM | BODY MASS INDEX: 31.07 KG/M2 | HEIGHT: 70 IN | SYSTOLIC BLOOD PRESSURE: 114 MMHG | WEIGHT: 217 LBS | DIASTOLIC BLOOD PRESSURE: 64 MMHG

## 2024-12-03 DIAGNOSIS — O09.523 MULTIGRAVIDA OF ADVANCED MATERNAL AGE IN THIRD TRIMESTER: ICD-10-CM

## 2024-12-03 DIAGNOSIS — O09.299 HX OF MACROSOMIA IN INFANT IN PRIOR PREGNANCY, CURRENTLY PREGNANT: ICD-10-CM

## 2024-12-03 DIAGNOSIS — O09.299 HX OF OLIGOHYDRAMNIOS IN PRIOR PREGNANCY, CURRENTLY PREGNANT: ICD-10-CM

## 2024-12-03 DIAGNOSIS — Z36.89 ENCOUNTER FOR ULTRASOUND TO CHECK FETAL GROWTH: ICD-10-CM

## 2024-12-03 DIAGNOSIS — Z3A.31 31 WEEKS GESTATION OF PREGNANCY: Primary | ICD-10-CM

## 2024-12-03 DIAGNOSIS — Z34.83 PRENATAL CARE, SUBSEQUENT PREGNANCY, THIRD TRIMESTER: Primary | ICD-10-CM

## 2024-12-03 LAB
SL AMB  POCT GLUCOSE, UA: NORMAL
SL AMB POCT URINE PROTEIN: NORMAL

## 2024-12-03 PROCEDURE — 76816 OB US FOLLOW-UP PER FETUS: CPT | Performed by: STUDENT IN AN ORGANIZED HEALTH CARE EDUCATION/TRAINING PROGRAM

## 2024-12-03 PROCEDURE — PNV: Performed by: OBSTETRICS & GYNECOLOGY

## 2024-12-03 PROCEDURE — 81002 URINALYSIS NONAUTO W/O SCOPE: CPT | Performed by: OBSTETRICS & GYNECOLOGY

## 2024-12-03 NOTE — PROGRESS NOTES
This patient received  care under my supervision on 24 at 31w6d gestational age at [facility].  The note is contained in the ultrasound report located under OB Procedures tab in Epic.  Please call our office at [phone] with questions.  -Chinyere Ayala MD

## 2024-12-03 NOTE — TELEPHONE ENCOUNTER
scheduled patient for growth in 4 weeks per AVS. patient requested 1/3 in BE. was able to accomodate

## 2024-12-03 NOTE — PROGRESS NOTES
31w5d  Yellow Folder Given to patient today  Delivery Consent Signed today.  Declines vaccines  US scheduled for 12/3/24  Denies vaginal bleeding and contractions  Reports still having discharge and unsure if its fluid leaking  +Fetal Movement?

## 2024-12-06 LAB
DME PARACHUTE DELIVERY DATE ACTUAL: NORMAL
DME PARACHUTE DELIVERY DATE REQUESTED: NORMAL
DME PARACHUTE ITEM DESCRIPTION: NORMAL
DME PARACHUTE ORDER STATUS: NORMAL
DME PARACHUTE SUPPLIER NAME: NORMAL
DME PARACHUTE SUPPLIER PHONE: NORMAL

## 2024-12-18 ENCOUNTER — ROUTINE PRENATAL (OUTPATIENT)
Age: 34
End: 2024-12-18
Payer: COMMERCIAL

## 2024-12-18 VITALS — BODY MASS INDEX: 31.6 KG/M2 | WEIGHT: 220.2 LBS | SYSTOLIC BLOOD PRESSURE: 130 MMHG | DIASTOLIC BLOOD PRESSURE: 72 MMHG

## 2024-12-18 DIAGNOSIS — Z34.83 PRENATAL CARE, SUBSEQUENT PREGNANCY, THIRD TRIMESTER: Primary | ICD-10-CM

## 2024-12-18 LAB
SL AMB  POCT GLUCOSE, UA: NORMAL
SL AMB POCT URINE PROTEIN: NORMAL

## 2024-12-18 PROCEDURE — 81002 URINALYSIS NONAUTO W/O SCOPE: CPT | Performed by: OBSTETRICS & GYNECOLOGY

## 2024-12-18 PROCEDURE — PNV: Performed by: OBSTETRICS & GYNECOLOGY

## 2024-12-18 NOTE — PROGRESS NOTES
Angelica presents for routine PN visit.   Birth plan returned - declines meds for baby - aware will have forms at hospital to sign.   Declines vaccinations for herself.   Baby is active.  She thinks this one is smaller, has follow up growth scheduled.   No bleeding, LOF, contractions.

## 2024-12-18 NOTE — PROGRESS NOTES
33w6d  Delivery Consent Previously signed  Growth US completed 12/3 - Rescan for Growth and MARIE in 4-6 wks- Scheduled  Declines Vaccines   Received a breast pump  Denies Leaking of Fluid, vaginal bleeding, contractions  +Fetal Movement

## 2024-12-23 ENCOUNTER — TELEPHONE (OUTPATIENT)
Dept: PERINATAL CARE | Facility: OTHER | Age: 34
End: 2024-12-23

## 2024-12-23 NOTE — TELEPHONE ENCOUNTER
Lvm for patient to call the office to reschedule the level 1 ultrasound that is scheduled on 1/3 @ [place] due to the office being closed.